# Patient Record
Sex: FEMALE | Race: BLACK OR AFRICAN AMERICAN | Employment: OTHER | URBAN - METROPOLITAN AREA
[De-identification: names, ages, dates, MRNs, and addresses within clinical notes are randomized per-mention and may not be internally consistent; named-entity substitution may affect disease eponyms.]

---

## 2017-06-03 ENCOUNTER — HOSPITAL ENCOUNTER (EMERGENCY)
Age: 82
Discharge: HOME | End: 2017-06-03
Attending: EMERGENCY MEDICINE
Payer: MEDICARE

## 2017-06-03 ENCOUNTER — APPOINTMENT (OUTPATIENT)
Dept: CT IMAGING | Age: 82
End: 2017-06-03
Attending: EMERGENCY MEDICINE
Payer: MEDICARE

## 2017-06-03 VITALS
DIASTOLIC BLOOD PRESSURE: 47 MMHG | SYSTOLIC BLOOD PRESSURE: 155 MMHG | HEART RATE: 88 BPM | TEMPERATURE: 96.9 F | RESPIRATION RATE: 12 BRPM | OXYGEN SATURATION: 96 %

## 2017-06-03 DIAGNOSIS — R10.84 ABDOMINAL PAIN, GENERALIZED: Primary | ICD-10-CM

## 2017-06-03 LAB
ALBUMIN SERPL BCP-MCNC: 3.9 G/DL (ref 3.5–5)
ALBUMIN/GLOB SERPL: 1.2 {RATIO} (ref 1.1–2.2)
ALP SERPL-CCNC: 65 U/L (ref 45–117)
ALT SERPL-CCNC: 41 U/L (ref 12–78)
ANION GAP BLD CALC-SCNC: 7 MMOL/L (ref 5–15)
APPEARANCE UR: CLEAR
AST SERPL W P-5'-P-CCNC: 21 U/L (ref 15–37)
BACTERIA URNS QL MICRO: NEGATIVE /HPF
BASOPHILS # BLD AUTO: 0 K/UL (ref 0–0.1)
BASOPHILS # BLD: 0 % (ref 0–1)
BILIRUB SERPL-MCNC: 0.4 MG/DL (ref 0.2–1)
BILIRUB UR QL: NEGATIVE
BUN SERPL-MCNC: 18 MG/DL (ref 6–20)
BUN/CREAT SERPL: 17 (ref 12–20)
CALCIUM SERPL-MCNC: 9.3 MG/DL (ref 8.5–10.1)
CHLORIDE SERPL-SCNC: 107 MMOL/L (ref 97–108)
CO2 SERPL-SCNC: 27 MMOL/L (ref 21–32)
COLOR UR: ABNORMAL
CREAT SERPL-MCNC: 1.07 MG/DL (ref 0.55–1.02)
EOSINOPHIL # BLD: 0.1 K/UL (ref 0–0.4)
EOSINOPHIL NFR BLD: 1 % (ref 0–7)
EPITH CASTS URNS QL MICRO: ABNORMAL /LPF
ERYTHROCYTE [DISTWIDTH] IN BLOOD BY AUTOMATED COUNT: 14 % (ref 11.5–14.5)
GLOBULIN SER CALC-MCNC: 3.3 G/DL (ref 2–4)
GLUCOSE SERPL-MCNC: 115 MG/DL (ref 65–100)
GLUCOSE UR STRIP.AUTO-MCNC: NEGATIVE MG/DL
HCT VFR BLD AUTO: 40.4 % (ref 35–47)
HGB BLD-MCNC: 13.2 G/DL (ref 11.5–16)
HGB UR QL STRIP: NEGATIVE
HYALINE CASTS URNS QL MICRO: ABNORMAL /LPF (ref 0–5)
KETONES UR QL STRIP.AUTO: 40 MG/DL
LEUKOCYTE ESTERASE UR QL STRIP.AUTO: NEGATIVE
LIPASE SERPL-CCNC: 106 U/L (ref 73–393)
LYMPHOCYTES # BLD AUTO: 14 % (ref 12–49)
LYMPHOCYTES # BLD: 1.8 K/UL (ref 0.8–3.5)
MCH RBC QN AUTO: 28.1 PG (ref 26–34)
MCHC RBC AUTO-ENTMCNC: 32.7 G/DL (ref 30–36.5)
MCV RBC AUTO: 86 FL (ref 80–99)
MONOCYTES # BLD: 1.1 K/UL (ref 0–1)
MONOCYTES NFR BLD AUTO: 9 % (ref 5–13)
NEUTS SEG # BLD: 9.9 K/UL (ref 1.8–8)
NEUTS SEG NFR BLD AUTO: 76 % (ref 32–75)
NITRITE UR QL STRIP.AUTO: NEGATIVE
PH UR STRIP: 5 [PH] (ref 5–8)
PLATELET # BLD AUTO: 174 K/UL (ref 150–400)
POTASSIUM SERPL-SCNC: 4 MMOL/L (ref 3.5–5.1)
PROT SERPL-MCNC: 7.2 G/DL (ref 6.4–8.2)
PROT UR STRIP-MCNC: NEGATIVE MG/DL
RBC # BLD AUTO: 4.7 M/UL (ref 3.8–5.2)
RBC #/AREA URNS HPF: ABNORMAL /HPF (ref 0–5)
SODIUM SERPL-SCNC: 141 MMOL/L (ref 136–145)
SP GR UR REFRACTOMETRY: 1.02 (ref 1–1.03)
UA: UC IF INDICATED,UAUC: ABNORMAL
UROBILINOGEN UR QL STRIP.AUTO: 0.2 EU/DL (ref 0.2–1)
WBC # BLD AUTO: 13 K/UL (ref 3.6–11)
WBC URNS QL MICRO: ABNORMAL /HPF (ref 0–4)

## 2017-06-03 PROCEDURE — 36415 COLL VENOUS BLD VENIPUNCTURE: CPT | Performed by: EMERGENCY MEDICINE

## 2017-06-03 PROCEDURE — 80053 COMPREHEN METABOLIC PANEL: CPT | Performed by: EMERGENCY MEDICINE

## 2017-06-03 PROCEDURE — 74177 CT ABD & PELVIS W/CONTRAST: CPT

## 2017-06-03 PROCEDURE — 74011250636 HC RX REV CODE- 250/636: Performed by: EMERGENCY MEDICINE

## 2017-06-03 PROCEDURE — 74011636320 HC RX REV CODE- 636/320: Performed by: EMERGENCY MEDICINE

## 2017-06-03 PROCEDURE — 96360 HYDRATION IV INFUSION INIT: CPT

## 2017-06-03 PROCEDURE — 96361 HYDRATE IV INFUSION ADD-ON: CPT

## 2017-06-03 PROCEDURE — 83690 ASSAY OF LIPASE: CPT | Performed by: EMERGENCY MEDICINE

## 2017-06-03 PROCEDURE — 96372 THER/PROPH/DIAG INJ SC/IM: CPT

## 2017-06-03 PROCEDURE — 85025 COMPLETE CBC W/AUTO DIFF WBC: CPT | Performed by: EMERGENCY MEDICINE

## 2017-06-03 PROCEDURE — 81001 URINALYSIS AUTO W/SCOPE: CPT | Performed by: EMERGENCY MEDICINE

## 2017-06-03 PROCEDURE — 99285 EMERGENCY DEPT VISIT HI MDM: CPT

## 2017-06-03 RX ORDER — SODIUM CHLORIDE 0.9 % (FLUSH) 0.9 %
10 SYRINGE (ML) INJECTION
Status: COMPLETED | OUTPATIENT
Start: 2017-06-03 | End: 2017-06-03

## 2017-06-03 RX ORDER — DICYCLOMINE HYDROCHLORIDE 10 MG/ML
20 INJECTION INTRAMUSCULAR
Status: COMPLETED | OUTPATIENT
Start: 2017-06-03 | End: 2017-06-03

## 2017-06-03 RX ORDER — DICYCLOMINE HYDROCHLORIDE 20 MG/1
20 TABLET ORAL EVERY 6 HOURS
Qty: 20 TAB | Refills: 0 | Status: SHIPPED | OUTPATIENT
Start: 2017-06-03 | End: 2017-06-08

## 2017-06-03 RX ORDER — SODIUM CHLORIDE 9 MG/ML
50 INJECTION, SOLUTION INTRAVENOUS
Status: COMPLETED | OUTPATIENT
Start: 2017-06-03 | End: 2017-06-03

## 2017-06-03 RX ORDER — ONDANSETRON 4 MG/1
4 TABLET, ORALLY DISINTEGRATING ORAL
Qty: 10 TAB | Refills: 0 | Status: SHIPPED | OUTPATIENT
Start: 2017-06-03 | End: 2017-11-23

## 2017-06-03 RX ADMIN — IOPAMIDOL 100 ML: 755 INJECTION, SOLUTION INTRAVENOUS at 21:13

## 2017-06-03 RX ADMIN — SODIUM CHLORIDE 50 ML/HR: 900 INJECTION, SOLUTION INTRAVENOUS at 21:13

## 2017-06-03 RX ADMIN — DICYCLOMINE HYDROCHLORIDE 20 MG: 20 INJECTION, SOLUTION INTRAMUSCULAR at 20:20

## 2017-06-03 RX ADMIN — Medication 10 ML: at 21:13

## 2017-06-03 RX ADMIN — SODIUM CHLORIDE 1000 ML: 900 INJECTION, SOLUTION INTRAVENOUS at 20:15

## 2017-06-03 NOTE — ED NOTES
Pt arrives to ED via EMS c/o RUQ abd pain with rebound tenderness. Pt states she had one episode of vomiting on her porch this evening. Monitor x 2.

## 2017-06-03 NOTE — ED PROVIDER NOTES
HPI Comments: [id number], 80 y.o. Female with PMHx of HTN, stroke, dementia, anxiety, and anemia presents via EMS to the ED with cc of diffuse abd pain and N/V x this afternoon. Pt has dementia and does not currently have any complaints. Daughter reports that she stopped by her parent's house to feed them and her father reported that the pt had vomited ~ 25 minutes prior to her arrival. The pt vomited again soon after and daughter found her slumped over and SOB. The daughter called EMS and reports more episodes of vomiting upon their arrival. Per daughter, the pt intermittently gets episodes of abd pain when she eats early or late. Pt has not eaten anything since this morning. Pt reports normal BMs and urination. Her last BM was this morning. Pt denies any fevers, chills, diarrhea, CP, or cough. PCP: David Cummings MD    Social history significant for: - Tobacco, - EtOH, - Illicit Drug Use  PSHx: cholecystomy     There are no other complaints, changes, or physical findings at this time. Written by ADITHYA Landis, as dictated by Yvrose Freedman MD.      The history is provided by the patient. No  was used.         Past Medical History:   Diagnosis Date    Anemia     Arthritis     Chronic pain     all over due to arthritis/knees/back/hand    DEMENTIA     Glaucoma     Hypertension     Other ill-defined conditions(799.89)     hiatal hernia    Other ill-defined conditions(799.89)     glaucoma    Other ill-defined conditions(799.89)     diverticulosis    Other ill-defined conditions(799.89)     2 cysts in arms    Other ill-defined conditions(799.89)     intestional blockage    Psychiatric disorder     dementia/anxiety    Stroke [facility])     mini stroke       Past Surgical History:   Procedure Laterality Date    HX GYN      surgery to removed  fetus (abdominal incision)    HX LAP CHOLECYSTECTOMY      HX ORTHOPAEDIC      cyst removed from right arm - benign         No family history on file. Social History     Social History    Marital status:      Spouse name: N/A    Number of children: N/A    Years of education: N/A     Occupational History    Not on file. Social History Main Topics    Smoking status: Former Smoker    Smokeless tobacco: Not on file      Comment: former cigarette smoker - 15 yrs ago    Alcohol use No    Drug use: Not on file    Sexual activity: Not on file     Other Topics Concern    Not on file     Social History Narrative         ALLERGIES: Review of patient's allergies indicates no known allergies. Review of Systems   Constitutional: Negative for chills. HENT: Negative for congestion, rhinorrhea, sneezing and sore throat. Eyes: Negative for redness and visual disturbance. Respiratory: Negative for shortness of breath. Cardiovascular: Negative for leg swelling. Gastrointestinal: Positive for abdominal pain, nausea and vomiting. Genitourinary: Negative for difficulty urinating. Musculoskeletal: Negative for neck stiffness. Skin: Negative for rash. Neurological: Negative for dizziness, syncope and weakness. Hematological: Negative for adenopathy. Patient Vitals for the past 12 hrs:   Temp Pulse Resp BP SpO2   06/03/17 1830 - - - 155/69 96 %   06/03/17 1800 - - - 157/86 96 %   06/03/17 1748 96.9 °F (36.1 °C) 88 12 - 96 %   06/03/17 1746 - - - 160/90 -       Physical Exam   Constitutional: She is oriented to person, place, and time. She appears well-developed and well-nourished. Pleasantly demented   HENT:   Head: Normocephalic and atraumatic. Mouth/Throat: Oropharynx is clear and moist.   Eyes: Conjunctivae and EOM are normal.   Neck: Normal range of motion and full passive range of motion without pain. Neck supple. Cardiovascular: Normal rate, regular rhythm, S1 normal, S2 normal, normal heart sounds, intact distal pulses and normal pulses. No murmur heard.   Pulmonary/Chest: Effort normal and breath sounds normal. No respiratory distress. She has no wheezes. Abdominal: Soft. Normal appearance and bowel sounds are normal. She exhibits no distension. There is tenderness (diffuse). There is guarding. There is no rebound. Musculoskeletal: Normal range of motion. Neurological: She is alert and oriented to person, place, and time. She has normal strength. Skin: Skin is warm, dry and intact. No rash noted. Psychiatric: She has a normal mood and affect. Her speech is normal and behavior is normal. Judgment and thought content normal.   Nursing note and vitals reviewed. MDM  Number of Diagnoses or Management Options  Diagnosis management comments: DDx: UTI, constipation, colitis, gastroenteritis, PNA, pancreatitis        Amount and/or Complexity of Data Reviewed  Clinical lab tests: ordered and reviewed  Obtain history from someone other than the patient: yes (Daughter)  Review and summarize past medical records: yes    Patient Progress  Patient progress: stable    ED Course       Procedures    7:42 PM  Pt asked for pain medication, will order Bentyl. Written by ADITHYA Hsu, as dictated by Janeth Ba MD.     9:46 PM   Pain has completely resolved and pt is ready for d/c  Written by ADITHYA Hsu, as dictated by Janeth Ba MD.    LABORATORY TESTS:  Recent Results (from the past 12 hour(s))   CBC WITH AUTOMATED DIFF    Collection Time: 06/03/17  5:54 PM   Result Value Ref Range    WBC 13.0 (H) 3.6 - 11.0 K/uL    RBC 4.70 3.80 - 5.20 M/uL    HGB 13.2 11.5 - 16.0 g/dL    HCT 40.4 35.0 - 47.0 %    MCV 86.0 80.0 - 99.0 FL    MCH 28.1 26.0 - 34.0 PG    MCHC 32.7 30.0 - 36.5 g/dL    RDW 14.0 11.5 - 14.5 %    PLATELET 182 523 - 874 K/uL    NEUTROPHILS 76 (H) 32 - 75 %    LYMPHOCYTES 14 12 - 49 %    MONOCYTES 9 5 - 13 %    EOSINOPHILS 1 0 - 7 %    BASOPHILS 0 0 - 1 %    ABS. NEUTROPHILS 9.9 (H) 1.8 - 8.0 K/UL    ABS.  LYMPHOCYTES 1.8 0.8 - 3.5 K/UL    ABS. MONOCYTES 1.1 (H) 0.0 - 1.0 K/UL    ABS. EOSINOPHILS 0.1 0.0 - 0.4 K/UL    ABS. BASOPHILS 0.0 0.0 - 0.1 K/UL   METABOLIC PANEL, COMPREHENSIVE    Collection Time: 06/03/17  5:54 PM   Result Value Ref Range    Sodium 141 136 - 145 mmol/L    Potassium 4.0 3.5 - 5.1 mmol/L    Chloride 107 97 - 108 mmol/L    CO2 27 21 - 32 mmol/L    Anion gap 7 5 - 15 mmol/L    Glucose 115 (H) 65 - 100 mg/dL    BUN 18 6 - 20 MG/DL    Creatinine 1.07 (H) 0.55 - 1.02 MG/DL    BUN/Creatinine ratio 17 12 - 20      GFR est AA 59 (L) >60 ml/min/1.73m2    GFR est non-AA 49 (L) >60 ml/min/1.73m2    Calcium 9.3 8.5 - 10.1 MG/DL    Bilirubin, total 0.4 0.2 - 1.0 MG/DL    ALT (SGPT) 41 12 - 78 U/L    AST (SGOT) 21 15 - 37 U/L    Alk. phosphatase 65 45 - 117 U/L    Protein, total 7.2 6.4 - 8.2 g/dL    Albumin 3.9 3.5 - 5.0 g/dL    Globulin 3.3 2.0 - 4.0 g/dL    A-G Ratio 1.2 1.1 - 2.2     LIPASE    Collection Time: 06/03/17  5:54 PM   Result Value Ref Range    Lipase 106 73 - 393 U/L   URINALYSIS W/ REFLEX CULTURE    Collection Time: 06/03/17  7:01 PM   Result Value Ref Range    Color YELLOW/STRAW      Appearance CLEAR CLEAR      Specific gravity 1.023 1.003 - 1.030      pH (UA) 5.0 5.0 - 8.0      Protein NEGATIVE  NEG mg/dL    Glucose NEGATIVE  NEG mg/dL    Ketone 40 (A) NEG mg/dL    Bilirubin NEGATIVE  NEG      Blood NEGATIVE  NEG      Urobilinogen 0.2 0.2 - 1.0 EU/dL    Nitrites NEGATIVE  NEG      Leukocyte Esterase NEGATIVE  NEG      UA:UC IF INDICATED CULTURE NOT INDICATED BY UA RESULT CNI      WBC 0-4 0 - 4 /hpf    RBC 0-5 0 - 5 /hpf    Epithelial cells FEW FEW /lpf    Bacteria NEGATIVE  NEG /hpf    Hyaline cast 2-5 0 - 5 /lpf       IMAGING RESULTS:  CT ABD PELV W CONT   Final Result   EXAM: CT ABDOMEN PELVIS WITH CONTRAST  INDICATION: Dementia with diffuse abdominal pain, vomiting and leukocytosis,  evaluate for infectious cause. COMPARISON: None.   CONTRAST: 100 mL of Isovue-370.     TECHNIQUE:   Multislice helical CT was performed from the diaphragm to the symphysis pubis  during uneventful rapid bolus intravenous contrast administration. Oral contrast  was not administered. Contiguous 5 mm axial images were reconstructed and lung  and soft tissue windows were generated. Coronal and sagittal reformations were  generated. CT dose reduction was achieved through use of a standardized protocol  tailored for this examination and automatic exposure control for dose  modulation.     FINDINGS:  LOWER CHEST: The visualized portions of the lung bases are clear.     ABDOMEN:  Liver: The liver is normal in size and contour with no focal abnormality. Gallbladder and bile ducts: There are clips in the gallbladder fossa and the  gallbladder is absent. There is mild intrahepatic and extra hepatic biliary duct  dilatation with no mass or stone identified. Spleen: No abnormality. Pancreas: No abnormality. Adrenal glands: No abnormality. Kidneys: No abnormality.     PELVIS:  Reproductive organs: The uterus is small. Bladder: No abnormality.      BOWEL AND MESENTERY: There is fluid in the small bowel without bowel  obstruction. There is no mesenteric mass or adenopathy. The appendix is normal.  There are colonic diverticula and there is gas and stool throughout the colon  to the rectum.      PERITONEUM: There is no ascites or free intraperitoneal air.     RETROPERITONEUM: The aorta tapers without aneurysm. There is no retroperitoneal  adenopathy or mass. There is no pelvic mass or adenopathy.     BONES AND SOFT TISSUES: The bones and soft tissues of the abdominal wall are  within normal limits.     IMPRESSION  IMPRESSION:   1. No acute abdominal or pelvic abnormality. 2. Status post cholecystectomy with biliary duct dilatation. Clinical and  laboratory correlation is suggested. 3. Fluid-filled small bowel without obstruction. 4. Colonic diverticulosis without evidence of diverticulitis.   5. Atherosclerotic abdominal aorta without aneurysm.    MEDICATIONS GIVEN:  Medications   sodium chloride 0.9 % bolus infusion 1,000 mL (1,000 mL IntraVENous New Bag 6/3/17 2015)   dicyclomine (BENTYL) 10 mg/mL injection 20 mg (20 mg IntraMUSCular Given 6/3/17 2020)   0.9% sodium chloride infusion (50 mL/hr IntraVENous New Bag 6/3/17 2113)   iopamidol (ISOVUE-370) 76 % injection 100 mL (100 mL IntraVENous Given 6/3/17 2113)   sodium chloride (NS) flush 10 mL (10 mL IntraVENous Given 6/3/17 2113)       IMPRESSION:  1. Abdominal pain, generalized        PLAN:  1. Current Discharge Medication List      START taking these medications    Details   dicyclomine (BENTYL) 20 mg tablet Take 1 Tab by mouth every six (6) hours for 20 doses. Qty: 20 Tab, Refills: 0      ondansetron (ZOFRAN ODT) 4 mg disintegrating tablet Take 1 Tab by mouth every eight (8) hours as needed for Nausea. Qty: 10 Tab, Refills: 0         CONTINUE these medications which have NOT CHANGED    Details   BRIMONIDINE TARTRATE/TIMOLOL (COMBIGAN OP) Apply  to eye. aspirin 81 mg chewable tablet Take 81 mg by mouth daily. Indications: pt takes 1/2 tablet      cyanocobalamin (VITAMIN B-12) 1,000 mcg tablet Take 1,000 mcg by mouth daily. meclizine (ANTIVERT) 25 mg tablet Take 1 Tab by mouth three (3) times daily as needed for Dizziness. Qty: 20 Tab, Refills: 0      cephALEXin (KEFLEX) 500 mg capsule Take 1 Cap by mouth three (3) times daily. Qty: 15 Cap, Refills: 0      diclofenac (VOLTAREN) 1 % topical gel Apply 4 g to affected area three (3) times daily. GLUCOSAMINE HCL/CHONDR JONES A NA (GLUCOSAMINE-CHONDROITIN) 750-600 mg tab Take 1 Tab by mouth daily. acetaminophen (TYLENOL) 325 mg tablet Take 325 mg by mouth every six (6) hours as needed for Pain. MEMANTINE HCL (NAMENDA PO) Take 28 mg by mouth daily. ascorbic acid (VITAMIN C) 250 mg tablet Take 500 mg by mouth daily. POTASSIUM CHLORIDE (KLOR-CON PO) Take 10 mEq by mouth daily.       FERROUS SULFATE Take 1 Tab by mouth daily.      diltiazem CD (CARTIA XT) 300 mg ER capsule Take 300 mg by mouth daily. spironolactone (ALDACTONE) 25 mg tablet Take 50 mg by mouth daily. 2.   Follow-up Information     Follow up With Details Comments Contact Info    Talia Reid MD Call in 2 days  [address] [place] . [address]  [phone]      [facility] EMERGENCY DEPT  As needed, If symptoms worsen [address] [place] [address]  [phone]        Return to ED if worse     Discharge Note:  9:51 PM  The pt is ready for discharge. The pt's signs, symptoms, diagnosis, and discharge instructions have been discussed and pt has conveyed their understanding. The pt is to follow up as recommended or return to ER should their symptoms worsen. Plan has been discussed and pt is in agreement. This note is prepared by Misty Frank, acting as a Scribe for Gray Wheeler MD.    Gray Wheeler MD: The scribe's documentation has been prepared under my direction and personally reviewed by me in its entirety. I confirm that the notes above accurately reflects all work, treatment, procedures, and medical decision making performed by me.

## 2017-06-04 NOTE — ED NOTES
Care assumed and bedside SBAR report endorsed on 79yo female with PMH arthritis, dementia, cholecystectomy presents to ED for nausea and RUQ abdominal pain, alert and oriented x3, pain currently within manageable limits, IV patent, plan of care reinforced, bed in lowest position, side rails up x2, call bell within reach, will continue to monitor. 2035  IVF administered pre CT scan. 9:57 PM   Bed bath and linen change for urinary incontinence, transported to radiology for CT scan. 11:24 PM  Provider at bedside for dispo and follow up. Discharge plan reviewed and paperwork signed, pain level within manageable comfortable limits, IV removed, ambulatory to exit, gait steady, safety maintained.

## 2017-06-04 NOTE — DISCHARGE INSTRUCTIONS

## 2017-11-23 ENCOUNTER — HOSPITAL ENCOUNTER (INPATIENT)
Age: 82
LOS: 4 days | Discharge: NURSING, REHAB OR LONG-TERM CARE | DRG: 293 | End: 2017-11-27
Attending: EMERGENCY MEDICINE | Admitting: INTERNAL MEDICINE
Payer: MEDICARE

## 2017-11-23 ENCOUNTER — APPOINTMENT (OUTPATIENT)
Dept: GENERAL RADIOLOGY | Age: 82
DRG: 293 | End: 2017-11-23
Attending: EMERGENCY MEDICINE
Payer: MEDICARE

## 2017-11-23 DIAGNOSIS — I50.9 ACUTE CONGESTIVE HEART FAILURE, UNSPECIFIED CONGESTIVE HEART FAILURE TYPE: Primary | ICD-10-CM

## 2017-11-23 DIAGNOSIS — R00.1 BRADYCARDIA: ICD-10-CM

## 2017-11-23 DIAGNOSIS — R29.6 FALLS FREQUENTLY: ICD-10-CM

## 2017-11-23 LAB
ALBUMIN SERPL-MCNC: 3.4 G/DL (ref 3.5–5)
ALBUMIN/GLOB SERPL: 0.9 {RATIO} (ref 1.1–2.2)
ALP SERPL-CCNC: 61 U/L (ref 45–117)
ALT SERPL-CCNC: 33 U/L (ref 12–78)
ANION GAP SERPL CALC-SCNC: 8 MMOL/L (ref 5–15)
APPEARANCE UR: CLEAR
AST SERPL-CCNC: 36 U/L (ref 15–37)
BACTERIA URNS QL MICRO: ABNORMAL /HPF
BASOPHILS # BLD: 0 K/UL (ref 0–0.1)
BASOPHILS NFR BLD: 0 % (ref 0–1)
BILIRUB SERPL-MCNC: 0.3 MG/DL (ref 0.2–1)
BILIRUB UR QL: NEGATIVE
BNP SERPL-MCNC: ABNORMAL PG/ML (ref 0–450)
BUN SERPL-MCNC: 17 MG/DL (ref 6–20)
BUN/CREAT SERPL: 17 (ref 12–20)
CALCIUM SERPL-MCNC: 8.7 MG/DL (ref 8.5–10.1)
CHLORIDE SERPL-SCNC: 110 MMOL/L (ref 97–108)
CO2 SERPL-SCNC: 26 MMOL/L (ref 21–32)
COLOR UR: ABNORMAL
CREAT SERPL-MCNC: 0.99 MG/DL (ref 0.55–1.02)
EOSINOPHIL # BLD: 0.3 K/UL (ref 0–0.4)
EOSINOPHIL NFR BLD: 4 % (ref 0–7)
EPITH CASTS URNS QL MICRO: ABNORMAL /LPF
ERYTHROCYTE [DISTWIDTH] IN BLOOD BY AUTOMATED COUNT: 14.5 % (ref 11.5–14.5)
GLOBULIN SER CALC-MCNC: 3.6 G/DL (ref 2–4)
GLUCOSE SERPL-MCNC: 89 MG/DL (ref 65–100)
GLUCOSE UR STRIP.AUTO-MCNC: NEGATIVE MG/DL
HCT VFR BLD AUTO: 34.9 % (ref 35–47)
HGB BLD-MCNC: 11.3 G/DL (ref 11.5–16)
HGB UR QL STRIP: NEGATIVE
KETONES UR QL STRIP.AUTO: NEGATIVE MG/DL
LEUKOCYTE ESTERASE UR QL STRIP.AUTO: NEGATIVE
LYMPHOCYTES # BLD: 2.4 K/UL (ref 0.8–3.5)
LYMPHOCYTES NFR BLD: 33 % (ref 12–49)
MCH RBC QN AUTO: 27.9 PG (ref 26–34)
MCHC RBC AUTO-ENTMCNC: 32.4 G/DL (ref 30–36.5)
MCV RBC AUTO: 86.2 FL (ref 80–99)
MONOCYTES # BLD: 1 K/UL (ref 0–1)
MONOCYTES NFR BLD: 13 % (ref 5–13)
MUCOUS THREADS URNS QL MICRO: ABNORMAL /LPF
NEUTS SEG # BLD: 3.7 K/UL (ref 1.8–8)
NEUTS SEG NFR BLD: 50 % (ref 32–75)
NITRITE UR QL STRIP.AUTO: NEGATIVE
PH UR STRIP: 5.5 [PH] (ref 5–8)
PLATELET # BLD AUTO: 178 K/UL (ref 150–400)
POTASSIUM SERPL-SCNC: 3.9 MMOL/L (ref 3.5–5.1)
PROT SERPL-MCNC: 7 G/DL (ref 6.4–8.2)
PROT UR STRIP-MCNC: NEGATIVE MG/DL
RBC # BLD AUTO: 4.05 M/UL (ref 3.8–5.2)
RBC #/AREA URNS HPF: ABNORMAL /HPF (ref 0–5)
SODIUM SERPL-SCNC: 144 MMOL/L (ref 136–145)
SP GR UR REFRACTOMETRY: 1.02 (ref 1–1.03)
TROPONIN I SERPL-MCNC: 0.04 NG/ML
UA: UC IF INDICATED,UAUC: ABNORMAL
UROBILINOGEN UR QL STRIP.AUTO: 1 EU/DL (ref 0.2–1)
WBC # BLD AUTO: 7.3 K/UL (ref 3.6–11)
WBC URNS QL MICRO: ABNORMAL /HPF (ref 0–4)

## 2017-11-23 PROCEDURE — 74011250636 HC RX REV CODE- 250/636: Performed by: EMERGENCY MEDICINE

## 2017-11-23 PROCEDURE — 99285 EMERGENCY DEPT VISIT HI MDM: CPT

## 2017-11-23 PROCEDURE — 73562 X-RAY EXAM OF KNEE 3: CPT

## 2017-11-23 PROCEDURE — 80053 COMPREHEN METABOLIC PANEL: CPT | Performed by: EMERGENCY MEDICINE

## 2017-11-23 PROCEDURE — 36415 COLL VENOUS BLD VENIPUNCTURE: CPT | Performed by: EMERGENCY MEDICINE

## 2017-11-23 PROCEDURE — 74011250637 HC RX REV CODE- 250/637: Performed by: INTERNAL MEDICINE

## 2017-11-23 PROCEDURE — 71010 XR CHEST PORT: CPT

## 2017-11-23 PROCEDURE — 85025 COMPLETE CBC W/AUTO DIFF WBC: CPT | Performed by: EMERGENCY MEDICINE

## 2017-11-23 PROCEDURE — 81001 URINALYSIS AUTO W/SCOPE: CPT | Performed by: EMERGENCY MEDICINE

## 2017-11-23 PROCEDURE — 65660000000 HC RM CCU STEPDOWN

## 2017-11-23 PROCEDURE — 83880 ASSAY OF NATRIURETIC PEPTIDE: CPT | Performed by: EMERGENCY MEDICINE

## 2017-11-23 PROCEDURE — 93005 ELECTROCARDIOGRAM TRACING: CPT

## 2017-11-23 PROCEDURE — 87086 URINE CULTURE/COLONY COUNT: CPT | Performed by: EMERGENCY MEDICINE

## 2017-11-23 PROCEDURE — 77030038269 HC DRN EXT URIN PURWCK BARD -A

## 2017-11-23 PROCEDURE — 74011250636 HC RX REV CODE- 250/636: Performed by: INTERNAL MEDICINE

## 2017-11-23 PROCEDURE — 96374 THER/PROPH/DIAG INJ IV PUSH: CPT

## 2017-11-23 PROCEDURE — 84484 ASSAY OF TROPONIN QUANT: CPT | Performed by: EMERGENCY MEDICINE

## 2017-11-23 RX ORDER — FUROSEMIDE 10 MG/ML
40 INJECTION INTRAMUSCULAR; INTRAVENOUS EVERY 12 HOURS
Status: DISCONTINUED | OUTPATIENT
Start: 2017-11-24 | End: 2017-11-24

## 2017-11-23 RX ORDER — SODIUM CHLORIDE 0.9 % (FLUSH) 0.9 %
5-10 SYRINGE (ML) INJECTION AS NEEDED
Status: DISCONTINUED | OUTPATIENT
Start: 2017-11-23 | End: 2017-11-27 | Stop reason: HOSPADM

## 2017-11-23 RX ORDER — SODIUM CHLORIDE 0.9 % (FLUSH) 0.9 %
5-10 SYRINGE (ML) INJECTION EVERY 8 HOURS
Status: DISCONTINUED | OUTPATIENT
Start: 2017-11-23 | End: 2017-11-27 | Stop reason: HOSPADM

## 2017-11-23 RX ORDER — ENOXAPARIN SODIUM 100 MG/ML
40 INJECTION SUBCUTANEOUS EVERY 24 HOURS
Status: DISCONTINUED | OUTPATIENT
Start: 2017-11-23 | End: 2017-11-27 | Stop reason: HOSPADM

## 2017-11-23 RX ORDER — FUROSEMIDE 20 MG/1
TABLET ORAL DAILY
Status: ON HOLD | COMMUNITY
End: 2017-11-27

## 2017-11-23 RX ORDER — MEMANTINE HYDROCHLORIDE 10 MG/1
10 TABLET ORAL 2 TIMES DAILY
Status: DISCONTINUED | OUTPATIENT
Start: 2017-11-23 | End: 2017-11-27 | Stop reason: HOSPADM

## 2017-11-23 RX ORDER — ONDANSETRON 2 MG/ML
4 INJECTION INTRAMUSCULAR; INTRAVENOUS
Status: DISCONTINUED | OUTPATIENT
Start: 2017-11-23 | End: 2017-11-27 | Stop reason: HOSPADM

## 2017-11-23 RX ORDER — FUROSEMIDE 10 MG/ML
80 INJECTION INTRAMUSCULAR; INTRAVENOUS
Status: COMPLETED | OUTPATIENT
Start: 2017-11-23 | End: 2017-11-23

## 2017-11-23 RX ORDER — ACETAMINOPHEN 325 MG/1
650 TABLET ORAL
Status: DISCONTINUED | OUTPATIENT
Start: 2017-11-23 | End: 2017-11-27 | Stop reason: HOSPADM

## 2017-11-23 RX ADMIN — FUROSEMIDE 80 MG: 10 INJECTION, SOLUTION INTRAMUSCULAR; INTRAVENOUS at 16:29

## 2017-11-23 RX ADMIN — Medication 10 ML: at 21:28

## 2017-11-23 RX ADMIN — MEMANTINE 10 MG: 10 TABLET ORAL at 19:40

## 2017-11-23 RX ADMIN — ENOXAPARIN SODIUM 40 MG: 40 INJECTION SUBCUTANEOUS at 21:28

## 2017-11-23 NOTE — ED NOTES
TRANSFER - OUT REPORT:    Verbal report given to [place], RN (name) on Speedy Kincaid  being transferred to [facility] (unit) for routine progression of care       Report consisted of patients Situation, Background, Assessment and   Recommendations(SBAR). Information from the following report(s)SBAR, Kardex, ED Summary, Intake/Output, MAR, Recent Results, Med Rec Status  was reviewed with the receiving nurse. Lines:   Peripheral IV 11/23/17 Right Antecubital (Active)   Site Assessment Clean, dry, & intact 11/23/2017  5:38 PM   Phlebitis Assessment 0 11/23/2017  5:38 PM   Infiltration Assessment 0 11/23/2017  5:38 PM   Dressing Status Clean, dry, & intact 11/23/2017  5:38 PM   Dressing Type Tape;Transparent 11/23/2017  5:38 PM   Hub Color/Line Status Pink;Flushed 11/23/2017  5:38 PM   Action Taken Blood drawn 11/23/2017  5:38 PM        Opportunity for questions and clarification was provided.       Patient transported with:   Vaccibody

## 2017-11-23 NOTE — IP AVS SNAPSHOT
[address] [facility] 83. 
[phone] Patient: Pancho Hall MRN: [id number] MFX:[birth year] About your hospitalization You were admitted on:  November 23, 2017 You last received care in the:  [facility] [place] [facility] CARDIOPULMONARY CARE You were discharged on:  November 27, 2017 Why you were hospitalized Your primary diagnosis was:  Not on File Your diagnoses also included:  Acute Chf (Congestive Heart Failure) (Hcc) Things You Need To Do (next 8 weeks) Follow up with Lisa Hardin MD in 2 week(s) Phone:  [phone] Where:  [address] [facility], [address] [facility], [place] [id number] Schedule an appointment with Unique Ambrosio MD as soon as possible for a visit in 1 week(s) Phone:  [phone] Where:  [address], [address] [facility], [address] [place] [address] Follow up with [address] [place] [address] ([address]) Phone:  [phone] Where:  [address] [facility] 83. Discharge Orders None A check jeremiah indicates which time of day the medication should be taken. My Medications STOP taking these medications   
 spironolactone 25 mg tablet Commonly known as:  ALDACTONE  
   
  
  
TAKE these medications as instructed Instructions Each Dose to Equal  
 Morning Noon Evening Bedtime  
 acetaminophen 325 mg tablet Commonly known as:  TYLENOL Your last dose was: Your next dose is: Take 325 mg by mouth every six (6) hours as needed for Pain. 325 mg  
    
   
   
   
  
 aspirin 81 mg chewable tablet Your last dose was: Your next dose is: Take 40.5 mg by mouth every evening. Indications: pt takes 1/2 tablet 40.5 mg  
    
   
   
   
  
 COMBIGAN OP Your last dose was: Your next dose is:    
   
   
 Administer 1 Drop to both eyes two (2) times a day. 1 Drop docusate sodium 100 mg capsule Commonly known as:  Gevena Living Your last dose was: Your next dose is: Take 1 Cap by mouth two (2) times daily as needed for Constipation for up to 90 days. 100 mg FERROUS SULFATE Your last dose was: Your next dose is: Take 1 Tab by mouth daily. 1 Tab * furosemide 40 mg tablet Commonly known as:  LASIX Your last dose was: Your next dose is: Take 1 Tab by mouth daily. 40 mg  
    
   
   
   
  
 * furosemide 40 mg tablet Commonly known as:  LASIX Your last dose was: Your next dose is: Take 1 Tab by mouth daily. 40 mg  
    
   
   
   
  
 glucosamine-chondroitin 750-600 mg Tab Your last dose was: Your next dose is: Take 1 Tab by mouth daily. 1 Tab  
    
   
   
   
  
 latanoprost 0.005 % ophthalmic solution Commonly known as:  Rosario Kofi Your last dose was: Your next dose is:    
   
   
 Administer 1 Drop to both eyes daily. 1 Drop LOTRIMIN AF (CLOTRIMAZOLE) 1 % topical cream  
Generic drug:  clotrimazole Your last dose was: Your next dose is:    
   
   
 Apply  to affected area two (2) times daily as needed for Skin Irritation (White spots). NAMENDA XR 14 mg capsule Generic drug:  memantine ER Your last dose was: Your next dose is: Take 14 mg by mouth daily. 14 mg  
    
   
   
   
  
 potassium chloride SR 10 mEq tablet Commonly known as:  KLOR-CON 10 Start taking on:  11/28/2017 Your last dose was: Your next dose is: Take 1 Tab by mouth daily. 10 mEq VITAMIN B-12 1,000 mcg tablet Generic drug:  cyanocobalamin Your last dose was: Your next dose is: Take 1,000 mcg by mouth daily. 1000 mcg * ascorbic acid (vitamin C) 500 mg tablet Commonly known as:  VITAMIN C Your last dose was: Your next dose is: Take 500 mg by mouth every evening. 500 mg * VITAMIN C 250 mg tablet Generic drug:  ascorbic acid (vitamin C) Your last dose was: Your next dose is: Take 500 mg by mouth daily. 500 mg  
    
   
   
   
  
 VOLTAREN 1 % Gel Generic drug:  diclofenac Your last dose was: Your next dose is:    
   
   
 Apply 4 g to affected area three (3) times daily as needed for Pain. 4 g * Notice: This list has 4 medication(s) that are the same as other medications prescribed for you. Read the directions carefully, and ask your doctor or other care provider to review them with you. Where to Get Your Medications These medications were sent to [facility], [address] [place] [address] AT [address] [facility] 65, [address] [facility] [address] Phone:  [phone]  
  docusate sodium 100 mg capsule  
 furosemide 40 mg tablet  
 potassium chloride SR 10 mEq tablet Information on where to get these meds will be given to you by the nurse or doctor. ! Ask your nurse or doctor about these medications  
  furosemide 40 mg tablet Discharge Instructions Learning About Heart Failure What is heart failure? Heart failure means that your heart muscle does not pump as much blood as your body needs. Failure does not mean that your heart has stopped. It means that your heart is not pumping as well as it should. Your body has an amazing ability to make up for heart failure. It may do such a good job that you don't know you have a disease. But at some point, your heart and body will no longer be able to keep up. Then fluid starts to build up in your lungs and other parts of your body. What can you expect when you have heart failure? Heart failure is a lifelong (chronic) disease. Treatment may be able to slow the disease and help you feel better. But heart failure tends to get worse over time. Despite this, there are many steps you can take to feel better and stay healthy longer. Early on, your symptoms may not be too bad. As heart failure gets worse, symptoms typically get worse, and you may need to limit your activities. Heart failure can also get worse suddenly. If this happens, you need emergency care. Then, after treatment, your symptoms may go back to being stable (which means they stay the same) for a long time. Heart failure can lead to other health problems, such as heart rhythm problems. Over time, your treatment options may change, especially as your symptoms get worse. As heart failure gets worse, palliative care can help improve the quality of your life. You can do advance care planning todecide what kind of care you want at the end of your life. What are the symptoms? Symptoms of heart failure start to happen when your heart can't pump enough blood to the rest of your body. In the early stages of heart failure, you may: · Feel tired easily. · Be short of breath when you exert yourself. · Feel like your heart is pounding or racing (palpitations). · Feel weak or dizzy. As heart failure gets worse, fluid starts to build up in your lungs and other parts of your body. This may cause you to: · Feel short of breath even at rest. 
· Have swelling (edema), especially in your legs, ankles, and feet. · Gain weight. This may happen over just a day or two, or more slowly. · Cough or wheeze, especially when you lie down. How is heart failure treated? · You'll probably take several medicines. · You might attend cardiac rehabilitation (rehab) to get education and support that help you make lifestyle changes and stay as healthy as possible. · You may get a heart device. A pacemaker helps your heart pump blood. An ICD can stop abnormal heart rhythms. How can you care for yourself? There are many steps you can take to feel better and stay healthy longer. These steps are an important part of treatment. They can help you stay active and enjoy life. · Take your medicine the right way. Avoid medicines that can make your symptoms worse. · Check your weight and symptoms every day. Know what to do if your symptoms get worse. · Limit sodium. This helps keep fluid from building up. It may help you feel better. · Be active. Exercise regularly, but don't exercise too hard. · Be heart-healthy. Eat healthy foods, stay at a healthy weight, limit alcohol, and don't smoke. · Stay as healthy as possible. Avoid colds and flu, get help for depression and anxiety, and manage stress. Follow-up care is a key part of your treatment and safety. Be sure to make and go to all appointments, and call your doctor if you are having problems. It's also a good idea to know your test results and keep a list of the medicines you take. Where can you learn more? Go to http://césar-radha.info/. Enter V379 in the search box to learn more about \"Learning About Heart Failure. \" Current as of: September 21, 2016 Content Version: 11.4 © 2218-0613 Healthwise, Incorporated. Care instructions adapted under license by [employer] (which disclaims liability or warranty for this information). If you have questions about a medical condition or this instruction, always ask your healthcare professional. [place] [address] any warranty or liability for your use of this information. [employer] Announcement We are excited to announce that we are making your provider's discharge notes available to you in [employer].   You will see these notes when they are completed and signed by the physician that discharged you from your recent hospital stay. If you have any questions or concerns about any information you see in Ecal, please call the Health Information Department where you were seen or reach out to your Primary Care Provider for more information about your plan of care. Introducing [facility] & HEALTH SERVICES! Herb Mujica introduces Ecal patient portal. Now you can access parts of your medical record, email your doctor's office, and request medication refills online. 1. In your internet browser, go to [email or web address] 2. Click on the First Time User? Click Here link in the Sign In box. You will see the New Member Sign Up page. 3. Enter your Ecal Access Code exactly as it appears below. You will not need to use this code after youve completed the sign-up process. If you do not sign up before the expiration date, you must request a new code. · Ecal Access Code: [email or web address] Expires: 2/25/2018  3:35 PM 
 
4. Enter the last four digits of your Social Security Number (xxxx) and Date of Birth (mm/dd/yyyy) as indicated and click Submit. You will be taken to the next sign-up page. 5. Create a Ecal ID. This will be your Ecal login ID and cannot be changed, so think of one that is secure and easy to remember. 6. Create a Ecal password. You can change your password at any time. 7. Enter your Password Reset Question and Answer. This can be used at a later time if you forget your password. 8. Enter your e-mail address. You will receive e-mail notification when new information is available in [address] 9. Click Sign Up. You can now view and download portions of your medical record. 10. Click the Download Summary menu link to download a portable copy of your medical information. If you have questions, please visit the Frequently Asked Questions section of the Ecal website. Remember, Ecal is NOT to be used for urgent needs. For medical emergencies, dial 911. Now available from your iPhone and Android! Providers Seen During Your Hospitalization Provider Specialty Primary office phone Joaquin Malone MD Emergency Medicine [phone] Antonia Art MD Internal Medicine [phone] Your Primary Care Physician (PCP) Primary Care Physician Office Phone Office Fax Lynette Santana [phone] You are allergic to the following No active allergies Recent Documentation Height Weight BMI OB Status Smoking Status 1.651 m 66.1 kg 24.25 kg/m2 Postmenopausal Former Smoker Emergency Contacts Name Discharge Info Relation Home Work Mobile Vicky Gunn DISCHARGE CAREGIVER [3] Child [2] [phone] MirnaShawalexandr DISCHARGE CAREGIVER [3] Spouse [3] [phone] Patient Belongings The following personal items are in your possession at time of discharge: 
  Dental Appliances: Lowers, Uppers, At home  Visual Aid: Glasses      Home Medications: None   Jewelry: None  Clothing: At bedside Please provide this summary of care documentation to your next provider. Signatures-by signing, you are acknowledging that this After Visit Summary has been reviewed with you and you have received a copy. Patient Signature:  ____________________________________________________________ Date:  ____________________________________________________________  
  
Loly Ray Provider Signature:  ____________________________________________________________ Date:  ____________________________________________________________

## 2017-11-23 NOTE — CONSULTS
Consult    NAME: Benja Shea   :  1931   MRN:  [id number]     Date/Time:  2017 4:35 PM    Patient PCP: Cruz Alba MD  ________________________________________________________________________     Assessment:     1. Falls  2. Leg swelling  3. Sick sinus syndrome  4. Echo  w/ EF 55%, mild LVH, mild TR, mild AI, RVSP 41mmHg  5. Holter  w/ SR and no sig bradycardia (HR 55-91)  6. Hypertension  7. Intermittent vertigo/ataxia  8. Dementia (significant)  9. Mild carotid disease  10. Usual Cardiologist:  Dr. Diony Hannah:   BNP 12k  EKG shows SR w/ PVC  No significant bradycardia noted on tele from ER  Recently seen by PCP 2d ago w/ d/c of CCB and started lasix    1. Diurese as tolerated; 80mg IV lasix noted from ER  2. Avoid AVNodal blocking agents  3. Keep on tele  4. No invasive procedures are desired by the family  5. Cont ASA  6. Cont aldactone (lytes stable); would aim for moderate control of blood pressure    Thank you for this consult and allowing me to take part in this patients care. Please call with questions. [x]        High complexity decision making was performed    @ OV        Ms Brain Rank has a h/o HTN, intermittent vertigo/ataxia (neg MRI  except extensive WMD) and significant dementia; Her daughter Sylvester Collet, pt of mine) cares for her and her  at night. The pt cannot participate in medical decision making; her daughter would like a non-invasive approach to care. 2016: Intermittent vertigo noted; No angina/CARRERO. Her HR was reportedly palpated at 38 on  PCP visit: ecg from then shows HR in 70s with PVC. No current complaints: mild carotid disease; echo unremarkable. 2016: No Sx; Dilt and aldactone decreased by PCP; dose unknown (?CKD). Did not supply current meds list, will call with updated meds.     CARDIAC HISTORY  ARRHYTHMIA:  1 Bradycardia [Neg holter.] - 2016     RISK FACTORS:  1 Hypertension CARDIOVASCULAR PROCEDURES  ECHO/MUGA:  Echo (EF 0.55 (55%), Mild LVH, Mild TR, Mild AR, Mild MR, LA Diam 4.4 cm, Est RVSP 41 mmHg, Normal RV.) - 5/23/2016   ELECTROPHYSIOLOGY:  EKG (Occasional PVCs, Sinus Rhythm, LAFB, poor r wave progression. No change.) - 8/11/2016   Holter (Sinus Rhythm, No Malignant Arrhythmias, (55-91, ave 71); no significant bradycardia); Incidental ASx 6 beats PAT. ) - 5/12/2016   VASCULAR:  Carotid Duplex (1 - 49% Bilateral ICAs, Vertebral: Bilateral Antegrade Flow) - 5/25/2016       IMPRESSION AND PLAN  01. Bradycardia:  Neg holter 5/2016: no Rx needed at this point. ECG done today   02. Occlusion and stenosis of bilateral carotid arteries:  Mild carotid disease; repeat next year. Carotid Duplex to be done as specified in the orders. 03. Hypertensive heart disease without heart failure:  Adequately controlled. 04. 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   05. Long term (current) use of aspirin:  This condition is stable. 06. Personal history of nicotine dependence:  remains abstinent from tobacco use. 07. Body mass index (BMI) 28.0-28.9, adult         ORDERS:  1 ECG done today   2 Carotid Duplex in 1 Year. 3 Giving encouragement to exercise   4 Dietary management education, guidance, and counseling   5 Return office visit with Yesenia Dailey MD in 1 Year.      FINAL MEDICATION LIST    Medication Sig Description   aspirin 325 mg tablet take 1 tablet by oral route  every day   Cartia  mg capsule,extended release changed by PCP   Glucosamine 500 mg tablet as directed   iron  mg (65 mg iron) capsule,extended release take 1 by Oral route  every day   Klor-Con 8 mEq tablet,extended release take 1 tablet by oral route  every day with food   Namenda XR 14 mg capsule sprinkle,extended release take 1 capsule by oral route  every day   spironolactone 50 mg tablet changed by PCP   Tylenol 325 mg tablet take 2 tablet by oral route  every 4 hours as needed   vitamin B complex tablet 1 po qd   Vitamin C 500 mg tablet one po qd           Subjective:   CHIEF COMPLAINT: Fall, leg swelling    HISTORY OF PRESENT ILLNESS:     Lalo Fitzgerald is a 80 y.o.  female who \"presents via EMS to the ED for evaluation after an unwitnessed mechanical GLF that had occurred earlier this afternoon PTA. Per EMS, they were called by pt's family when they had found her on the ground. They note that her BP was normal, but that she had a low HR in the 40s/50s. Per the pt's daughter, this is the pt's fourth fall in the last 1.5 weeks. She also notes that the pt has had some swelling in her legs for 2 weeks that is getting better. Her PCP had seen her 2 days ago and increased her diuretics with pending blood work drawn yesterday. Daughter also states that pt was found on floor and not opening her eyes as if she was steadily going to sleep and was c/o right hip and knee pain. Pt states that she remembers falling but denies any LOC or head injury and does not report any pain but does feel \"tight\" in her right knee. She also does report any hx of kidney or thyroid problems. \"      We were asked to consult for work up and evaluation of the above problems.      Past Medical History:   Diagnosis Date    Anemia     Arthritis     Chronic pain     all over due to arthritis/knees/back/hand    DEMENTIA     Glaucoma     Hypertension     Other ill-defined conditions(799.89)     hiatal hernia    Other ill-defined conditions(799.89)     glaucoma    Other ill-defined conditions(799.89)     diverticulosis    Other ill-defined conditions(799.89)     2 cysts in arms    Other ill-defined conditions(799.89)     intestional blockage    Psychiatric disorder     dementia/anxiety    Stroke [facility])     mini stroke      Past Surgical History:   Procedure Laterality Date    HX GYN      surgery to removed  fetus (abdominal incision)    HX LAP CHOLECYSTECTOMY      HX ORTHOPAEDIC      cyst removed from right arm - benign     No Known Allergies   Meds:  See below  Social History   Substance Use Topics    Smoking status: Former Smoker    Smokeless tobacco: Never Used      Comment: former cigarette smoker - 15 yrs ago    Alcohol use No      History reviewed. No pertinent family history. REVIEW OF SYSTEMS:     []         Unable to obtain  ROS due to ---   [x]         Total of 12 systems reviewed as follows:    Constitutional: negative fever, negative chills, negative weight loss  Eyes:   negative visual changes  ENT:   negative sore throat, tongue or lip swelling  Respiratory:  negative cough, negative dyspnea  Cards:  negative for chest pain, palpitations, lower extremity edema  GI:   negative for nausea, vomiting, diarrhea, and abdominal pain  Genitourinary: negative for frequency, dysuria  Integument:  negative for rash   Hematologic:  negative for easy bruising and gum/nose bleeding  Musculoskel: negative for myalgias,  back pain  Neurological:  negative for headaches, dizziness, vertigo, weakness  Behavl/Psych: negative for feelings of anxiety, depression     Pertinent Positives include :    Objective:      Physical Exam:    Last 24hrs VS reviewed since prior progress note. Most recent are:    Visit Vitals    /90    Pulse 70    Temp 98.7 °F (37.1 °C)    Resp 16    Ht 5' 5\" (1.651 m)    Wt 77.1 kg (170 lb)    SpO2 96%    BMI 28.29 kg/m2     No intake or output data in the 24 hours ending 11/23/17 1635     General Appearance: Well developed, well nourished, pleasantly demented,    no acute distress. Ears/Nose/Mouth/Throat: Pupils equal and round, Hearing grossly normal.  Neck: Supple. JVP within normal limits. Carotids good upstrokes, with no bruit. Chest: Lungs clear to auscultation bilaterally. Cardiovascular: Regular rate and rhythm, S1S2 normal, soft LENNY  Abdomen: Soft, non-tender, bowel sounds are active. No organomegaly.   Extremities: 2+ edema bilaterally. Femoral pulses +2, Distal Pulses +1. Skin: Warm and dry. Neuro: CN II-XII grossly intact, Strength and sensation grossly intact. []         Post-cath site without hematoma, bruit, tenderness, or thrill. Distal pulses intact. Data:      Telemetry:  SR w/ PVC    EKG:  SR w/ PVC  []  No new EKG for review. Prior to Admission medications    Medication Sig Start Date End Date Taking? Authorizing Provider   furosemide (LASIX) 20 mg tablet Take  by mouth daily. Yes Ashley Florentino MD   BRIMONIDINE TARTRATE/TIMOLOL (COMBIGAN OP) Apply  to eye. Yes Ashley Florentino MD   cyanocobalamin (VITAMIN B-12) 1,000 mcg tablet Take 1,000 mcg by mouth daily. Yes Ashley Florentino MD   diclofenac (VOLTAREN) 1 % topical gel Apply 4 g to affected area three (3) times daily. Yes Ashley Florentino MD   GLUCOSAMINE HCL/CHONDR JONES A NA (GLUCOSAMINE-CHONDROITIN) 750-600 mg tab Take 1 Tab by mouth daily. Yes Ashley Florentino MD   acetaminophen (TYLENOL) 325 mg tablet Take 325 mg by mouth every six (6) hours as needed for Pain. Yes Ashley Florentino MD   MEMANTINE HCL (NAMENDA PO) Take 28 mg by mouth daily. Yes Ashley Florentino MD   ascorbic acid (VITAMIN C) 250 mg tablet Take 500 mg by mouth daily. Yes Ashley Florentino MD   POTASSIUM CHLORIDE (KLOR-CON PO) Take 10 mEq by mouth daily. Yes Ashley Florentino MD   spironolactone (ALDACTONE) 25 mg tablet Take 50 mg by mouth daily. Yes Historical Provider   aspirin 81 mg chewable tablet Take 81 mg by mouth daily. Indications: pt takes 1/2 tablet    Ashley Florentino MD   FERROUS SULFATE Take 1 Tab by mouth daily.     Ashley Florentino MD       Recent Results (from the past 24 hour(s))   URINALYSIS W/ REFLEX CULTURE    Collection Time: 11/23/17  2:22 PM   Result Value Ref Range    Color YELLOW/STRAW      Appearance CLEAR CLEAR      Specific gravity 1.023 1.003 - 1.030      pH (UA) 5.5 5.0 - 8.0      Protein NEGATIVE  NEG mg/dL    Glucose NEGATIVE  NEG mg/dL    Ketone NEGATIVE  NEG mg/dL    Bilirubin NEGATIVE  NEG Blood NEGATIVE  NEG      Urobilinogen 1.0 0.2 - 1.0 EU/dL    Nitrites NEGATIVE  NEG      Leukocyte Esterase NEGATIVE  NEG      WBC 0-4 0 - 4 /hpf    RBC 0-5 0 - 5 /hpf    Epithelial cells MODERATE (A) FEW /lpf    Bacteria 1+ (A) NEG /hpf    UA:UC IF INDICATED URINE CULTURE ORDERED (A) CNI      Mucus 1+ (A) NEG /lpf   CBC WITH AUTOMATED DIFF    Collection Time: 11/23/17  2:25 PM   Result Value Ref Range    WBC 7.3 3.6 - 11.0 K/uL    RBC 4.05 3.80 - 5.20 M/uL    HGB 11.3 (L) 11.5 - 16.0 g/dL    HCT 34.9 (L) 35.0 - 47.0 %    MCV 86.2 80.0 - 99.0 FL    MCH 27.9 26.0 - 34.0 PG    MCHC 32.4 30.0 - 36.5 g/dL    RDW 14.5 11.5 - 14.5 %    PLATELET 618 696 - 218 K/uL    NEUTROPHILS 50 32 - 75 %    LYMPHOCYTES 33 12 - 49 %    MONOCYTES 13 5 - 13 %    EOSINOPHILS 4 0 - 7 %    BASOPHILS 0 0 - 1 %    ABS. NEUTROPHILS 3.7 1.8 - 8.0 K/UL    ABS. LYMPHOCYTES 2.4 0.8 - 3.5 K/UL    ABS. MONOCYTES 1.0 0.0 - 1.0 K/UL    ABS. EOSINOPHILS 0.3 0.0 - 0.4 K/UL    ABS. BASOPHILS 0.0 0.0 - 0.1 K/UL   METABOLIC PANEL, COMPREHENSIVE    Collection Time: 11/23/17  2:25 PM   Result Value Ref Range    Sodium 144 136 - 145 mmol/L    Potassium 3.9 3.5 - 5.1 mmol/L    Chloride 110 (H) 97 - 108 mmol/L    CO2 26 21 - 32 mmol/L    Anion gap 8 5 - 15 mmol/L    Glucose 89 65 - 100 mg/dL    BUN 17 6 - 20 MG/DL    Creatinine 0.99 0.55 - 1.02 MG/DL    BUN/Creatinine ratio 17 12 - 20      GFR est AA >60 >60 ml/min/1.73m2    GFR est non-AA 53 (L) >60 ml/min/1.73m2    Calcium 8.7 8.5 - 10.1 MG/DL    Bilirubin, total 0.3 0.2 - 1.0 MG/DL    ALT (SGPT) 33 12 - 78 U/L    AST (SGOT) 36 15 - 37 U/L    Alk.  phosphatase 61 45 - 117 U/L    Protein, total 7.0 6.4 - 8.2 g/dL    Albumin 3.4 (L) 3.5 - 5.0 g/dL    Globulin 3.6 2.0 - 4.0 g/dL    A-G Ratio 0.9 (L) 1.1 - 2.2     NT-PRO BNP    Collection Time: 11/23/17  2:25 PM   Result Value Ref Range    NT pro-BNP 17685 (H) 0 - 450 PG/ML   TROPONIN I    Collection Time: 11/23/17  2:25 PM   Result Value Ref Range Troponin-I, Qt. 0.04 <0.05 ng/mL        Clari Quinonez III, DO

## 2017-11-23 NOTE — ED NOTES
Assumed care of patient from Jm Ayala RN. Patient resting comfortably on stretcher with call bell within reach. Family at bedside. Rates pain 0/10. Patient HR has been low for previous nurse, when patient goes to sleep her heart rate decreases to high 40s.     1620 Patient sheets changed. Pure wick applied to patient. Daughter and granddaughter remain at bedside.
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
## 2017-11-23 NOTE — IP AVS SNAPSHOT
[address] [facility] 
[phone] Patient: Herminia Wallace MRN: [id number] PUX:[id number] My Medications STOP taking these medications   
 spironolactone 25 mg tablet Commonly known as:  ALDACTONE  
   
  
  
TAKE these medications as instructed Instructions Each Dose to Equal  
 Morning Noon Evening Bedtime  
 acetaminophen 325 mg tablet Commonly known as:  TYLENOL Your last dose was: Your next dose is: Take 325 mg by mouth every six (6) hours as needed for Pain. 325 mg  
    
   
   
   
  
 aspirin 81 mg chewable tablet Your last dose was: Your next dose is: Take 40.5 mg by mouth every evening. Indications: pt takes 1/2 tablet 40.5 mg  
    
   
   
   
  
 COMBIGAN OP Your last dose was: Your next dose is:    
   
   
 Administer 1 Drop to both eyes two (2) times a day. 1 Drop  
    
   
   
   
  
 docusate sodium 100 mg capsule Commonly known as:  Molly Donate Your last dose was: Your next dose is: Take 1 Cap by mouth two (2) times daily as needed for Constipation for up to 90 days. 100 mg FERROUS SULFATE Your last dose was: Your next dose is: Take 1 Tab by mouth daily. 1 Tab * furosemide 40 mg tablet Commonly known as:  LASIX Your last dose was: Your next dose is: Take 1 Tab by mouth daily. 40 mg  
    
   
   
   
  
 * furosemide 40 mg tablet Commonly known as:  LASIX Your last dose was: Your next dose is: Take 1 Tab by mouth daily. 40 mg  
    
   
   
   
  
 glucosamine-chondroitin 750-600 mg Tab Your last dose was: Your next dose is: Take 1 Tab by mouth daily. 1 Tab  
    
   
   
   
  
 latanoprost 0.005 % ophthalmic solution Commonly known as:  Carline Owen Your last dose was: Your next dose is:    
   
   
 Administer 1 Drop to both eyes daily. 1 Drop LOTRIMIN AF (CLOTRIMAZOLE) 1 % topical cream  
Generic drug:  clotrimazole Your last dose was: Your next dose is:    
   
   
 Apply  to affected area two (2) times daily as needed for Skin Irritation (White spots). NAMENDA XR 14 mg capsule Generic drug:  memantine ER Your last dose was: Your next dose is: Take 14 mg by mouth daily. 14 mg  
    
   
   
   
  
 potassium chloride SR 10 mEq tablet Commonly known as:  KLOR-CON 10 Start taking on:  11/28/2017 Your last dose was: Your next dose is: Take 1 Tab by mouth daily. 10 mEq VITAMIN B-12 1,000 mcg tablet Generic drug:  cyanocobalamin Your last dose was: Your next dose is: Take 1,000 mcg by mouth daily. 1000 mcg * ascorbic acid (vitamin C) 500 mg tablet Commonly known as:  VITAMIN C Your last dose was: Your next dose is: Take 500 mg by mouth every evening. 500 mg * VITAMIN C 250 mg tablet Generic drug:  ascorbic acid (vitamin C) Your last dose was: Your next dose is: Take 500 mg by mouth daily. 500 mg  
    
   
   
   
  
 VOLTAREN 1 % Gel Generic drug:  diclofenac Your last dose was: Your next dose is:    
   
   
 Apply 4 g to affected area three (3) times daily as needed for Pain. 4 g * Notice: This list has 4 medication(s) that are the same as other medications prescribed for you. Read the directions carefully, and ask your doctor or other care provider to review them with you. Where to Get Your Medications These medications were sent to [facility], [address] [facility] [address], [address] [facility] [address] Phone:  [phone]  
  docusate sodium 100 mg capsule  
 furosemide 40 mg tablet  
 potassium chloride SR 10 mEq tablet Information on where to get these meds will be given to you by the nurse or doctor. ! Ask your nurse or doctor about these medications  
  furosemide 40 mg tablet

## 2017-11-23 NOTE — H&P
Hospitalist Admission Note    NAME: Jones Eid   :  1931   MRN:  [id number]     Date/Time:  2017 4:54 PM    Patient PCP: Vanda Solorzano MD  ________________________________________________________________________    My assessment of this patient's clinical condition and my plan of care is as follows. Assessment / Plan:    Acute CHF (unknown EF)  Bradycardia  -leg swelling; elevated proBNP 12K; CXR central congestion  -telemetry monitoring  -Echo in AM  -start IV lasix. Follow lytes and daily weights   -consult Cardiology    Frequent falls  Osteoarthritis  -xray R knee Tricompartmental osteoarthritis. Moderate joint effusion  -PT OT eval and treat. May benefit from SNF rehab    Dementia  -mood stable. Continue namenda    TIA  -continue ASA    Glaucoma        Code Status:  Full  Surrogate Decision Maker:  Daughters     DVT Prophylaxis:  lovenox  GI Prophylaxis: not indicated    Baseline:   . Does not use her walker regularly        Subjective:   CHIEF COMPLAINT:  Frequent falls    HISTORY OF PRESENT ILLNESS:     Charity Tolbert is a 80 y.o.  female with a history of HTN, dementia, TIA and OA who presents via EMS to the ER because of frequent falls. Daughter reports that the patient has fallen 4 times in the last 1.5 weeks. Patient is not clear on why falls. Sounds like her legs just gave out as she denies dizziness, vertigo or LOC. She endorses leg swelling x 2 weeks but denies SOB, CP or orthopnea. Her PCP just recently increased her lasix dose. EMS found patient bradycardic in the 40-50s. ER evaluation demonstrates elevated proBNP 12K and cxr with central congestion. We were asked to admit for work up and evaluation of the above problems.      Past Medical History:   Diagnosis Date    Anemia     Arthritis     Chronic pain     all over due to arthritis/knees/back/hand    DEMENTIA     Glaucoma     Hypertension     Other ill-defined conditions(799.89)     hiatal hernia    Other ill-defined conditions(799.89)     glaucoma    Other ill-defined conditions(799.89)     diverticulosis    Other ill-defined conditions(799.89)     2 cysts in arms    Other ill-defined conditions(799.89)     intestional blockage    Psychiatric disorder     dementia/anxiety    Stroke [facility])     mini stroke        Past Surgical History:   Procedure Laterality Date    HX GYN      surgery to removed  fetus (abdominal incision)    HX LAP CHOLECYSTECTOMY      HX ORTHOPAEDIC      cyst removed from right arm - benign       Social History   Substance Use Topics    Smoking status: Former Smoker    Smokeless tobacco: Never Used      Comment: former cigarette smoker - 15 yrs ago    Alcohol use No        FHx no early cad  No Known Allergies     Prior to Admission medications    Medication Sig Start Date End Date Taking? Authorizing Provider   furosemide (LASIX) 20 mg tablet Take  by mouth daily. Yes Ashley Florentino MD   BRIMONIDINE TARTRATE/TIMOLOL (COMBIGAN OP) Apply  to eye. Yes Ashley Florentino MD   cyanocobalamin (VITAMIN B-12) 1,000 mcg tablet Take 1,000 mcg by mouth daily. Yes Ashley Florentino MD   diclofenac (VOLTAREN) 1 % topical gel Apply 4 g to affected area three (3) times daily. Yes Ashley Florentino MD   GLUCOSAMINE HCL/CHONDR JONES A NA (GLUCOSAMINE-CHONDROITIN) 750-600 mg tab Take 1 Tab by mouth daily. Yes Ashley Florentino MD   acetaminophen (TYLENOL) 325 mg tablet Take 325 mg by mouth every six (6) hours as needed for Pain. Yes Ashley Florentino MD   MEMANTINE HCL (NAMENDA PO) Take 28 mg by mouth daily. Yes Ashley Florentino MD   ascorbic acid (VITAMIN C) 250 mg tablet Take 500 mg by mouth daily. Yes Ashley Florentino MD   POTASSIUM CHLORIDE (KLOR-CON PO) Take 10 mEq by mouth daily. Yes Ashley Florentino MD   spironolactone (ALDACTONE) 25 mg tablet Take 50 mg by mouth daily. Yes Historical Provider   aspirin 81 mg chewable tablet Take 81 mg by mouth daily.  Indications: pt takes 1/2 tablet    Ashley Florentino MD   FERROUS SULFATE Take 1 Tab by mouth daily. Ashley Florentino MD       REVIEW OF SYSTEMS:       Total of 12 systems reviewed as follows:       POSITIVE= underlined text  Negative = text not underlined  General:  fever, chills, sweats, generalized weakness, weight loss/gain,      loss of appetite   Eyes:    blurred vision, eye pain, loss of vision, double vision  ENT:    rhinorrhea, pharyngitis   Respiratory:   cough, sputum production, SOB, CARRERO, wheezing, pleuritic pain   Cardiology:   chest pain, palpitations, orthopnea, PND, edema, syncope   Gastrointestinal:  abdominal pain , N/V, diarrhea, dysphagia, constipation, bleeding   Genitourinary:  frequency, urgency, dysuria, hematuria, incontinence   Muskuloskeletal :  arthralgia, myalgia, knee pain  Hematology:  easy bruising, nose or gum bleeding, lymphadenopathy   Dermatological: rash, ulceration, pruritis, color change / jaundice  Endocrine:   hot flashes or polydipsia   Neurological:  headache, dizziness, confusion, focal weakness, paresthesia,     Speech difficulties, memory loss, gait difficulty, frequent falls  Psychological: Feelings of anxiety, depression, agitation    Objective:   VITALS:    Visit Vitals    /90    Pulse 70    Temp 98.7 °F (37.1 °C)    Resp 16    Ht 5' 5\" (1.651 m)    Wt 77.1 kg (170 lb)    SpO2 96%    BMI 28.29 kg/m2       PHYSICAL EXAM:    General:    Alert, cooperative, no distress, appears stated age. HEENT: Atraumatic, anicteric sclerae, pink conjunctivae     No oral ulcers, mucosa moist, throat clear, dentition fair  Neck:  Supple, symmetrical,  thyroid: non tender  Lungs:   Basal rales  Chest wall:  No tenderness  No Accessory muscle use. Heart:   Regular  rhythm,  + leg edema  Abdomen:   Soft, non-tender. Not distended. Bowel sounds normal  Extremities: No cyanosis. No clubbing, Skin turgor normal, Capillary refill normal, Radial dial pulse 2+  Skin:     Not pale.   Not Jaundiced  No rashes   Psych:  Fair to poor insight. Not depressed. Not anxious or agitated. Neurologic: EOMs intact. No facial asymmetry. No aphasia or slurred speech. Weak but equal strength, Sensation grossly intact. Alert and oriented X hospital. Recognize daughter.     _______________________________________________________________________  Care Plan discussed with:    Comments   Patient x    Family  x  daughter   RN     Care Manager                    Consultant:      _______________________________________________________________________  Expected  Disposition:   Home with Family    HH/PT/OT/RN    SNF/LTC x   [place]    ________________________________________________________________________  TOTAL TIME:  39   Minutes    Critical Care Provided     Minutes non procedure based      Comments    x Reviewed previous records   >50% of visit spent in counseling and coordination of care  Discussion with patient and/or family and questions answered       Given the patient's current clinical presentation, I have a high level of concern for decompensation if discharged from the ED. Complex decision making was performed which includes reviewing the patient's available past medical records, laboratory results, and Xray films. I have also directly communicated my plan and discussed this case with the involved ED physician.     ____________________________________________________________________  Daniela Garcia MD    Procedures: see electronic medical records for all procedures/Xrays and details which were not copied into this note but were reviewed prior to creation of Plan.     LAB DATA REVIEWED:    Recent Results (from the past 24 hour(s))   URINALYSIS W/ REFLEX CULTURE    Collection Time: 11/23/17  2:22 PM   Result Value Ref Range    Color YELLOW/STRAW      Appearance CLEAR CLEAR      Specific gravity 1.023 1.003 - 1.030      pH (UA) 5.5 5.0 - 8.0      Protein NEGATIVE  NEG mg/dL    Glucose NEGATIVE  NEG mg/dL    Ketone NEGATIVE  NEG mg/dL    Bilirubin NEGATIVE  NEG      Blood NEGATIVE  NEG      Urobilinogen 1.0 0.2 - 1.0 EU/dL    Nitrites NEGATIVE  NEG      Leukocyte Esterase NEGATIVE  NEG      WBC 0-4 0 - 4 /hpf    RBC 0-5 0 - 5 /hpf    Epithelial cells MODERATE (A) FEW /lpf    Bacteria 1+ (A) NEG /hpf    UA:UC IF INDICATED URINE CULTURE ORDERED (A) CNI      Mucus 1+ (A) NEG /lpf   CBC WITH AUTOMATED DIFF    Collection Time: 11/23/17  2:25 PM   Result Value Ref Range    WBC 7.3 3.6 - 11.0 K/uL    RBC 4.05 3.80 - 5.20 M/uL    HGB 11.3 (L) 11.5 - 16.0 g/dL    HCT 34.9 (L) 35.0 - 47.0 %    MCV 86.2 80.0 - 99.0 FL    MCH 27.9 26.0 - 34.0 PG    MCHC 32.4 30.0 - 36.5 g/dL    RDW 14.5 11.5 - 14.5 %    PLATELET 234 162 - 750 K/uL    NEUTROPHILS 50 32 - 75 %    LYMPHOCYTES 33 12 - 49 %    MONOCYTES 13 5 - 13 %    EOSINOPHILS 4 0 - 7 %    BASOPHILS 0 0 - 1 %    ABS. NEUTROPHILS 3.7 1.8 - 8.0 K/UL    ABS. LYMPHOCYTES 2.4 0.8 - 3.5 K/UL    ABS. MONOCYTES 1.0 0.0 - 1.0 K/UL    ABS. EOSINOPHILS 0.3 0.0 - 0.4 K/UL    ABS. BASOPHILS 0.0 0.0 - 0.1 K/UL   METABOLIC PANEL, COMPREHENSIVE    Collection Time: 11/23/17  2:25 PM   Result Value Ref Range    Sodium 144 136 - 145 mmol/L    Potassium 3.9 3.5 - 5.1 mmol/L    Chloride 110 (H) 97 - 108 mmol/L    CO2 26 21 - 32 mmol/L    Anion gap 8 5 - 15 mmol/L    Glucose 89 65 - 100 mg/dL    BUN 17 6 - 20 MG/DL    Creatinine 0.99 0.55 - 1.02 MG/DL    BUN/Creatinine ratio 17 12 - 20      GFR est AA >60 >60 ml/min/1.73m2    GFR est non-AA 53 (L) >60 ml/min/1.73m2    Calcium 8.7 8.5 - 10.1 MG/DL    Bilirubin, total 0.3 0.2 - 1.0 MG/DL    ALT (SGPT) 33 12 - 78 U/L    AST (SGOT) 36 15 - 37 U/L    Alk.  phosphatase 61 45 - 117 U/L    Protein, total 7.0 6.4 - 8.2 g/dL    Albumin 3.4 (L) 3.5 - 5.0 g/dL    Globulin 3.6 2.0 - 4.0 g/dL    A-G Ratio 0.9 (L) 1.1 - 2.2     NT-PRO BNP    Collection Time: 11/23/17  2:25 PM   Result Value Ref Range    NT pro-BNP 66271 (H) 0 - 450 PG/ML   TROPONIN I    Collection Time: 11/23/17  2:25 PM   Result Value Ref Range    Troponin-I, Qt. 0.04 <0.05 ng/mL

## 2017-11-23 NOTE — ED PROVIDER NOTES
[facility].  EMERGENCY DEPARTMENT HISTORY AND PHYSICAL EXAM       Date of Service: 11/23/2017   Patient Name: Sheila Parham   YOB: 1931  Medical Record Number: [id number]    History of Presenting Illness     Chief Complaint   Patient presents with   [facility] Fall     pt has had numerous falls over the past month, with a fall almost everyday this week. Pt denies any injury. History Provided By:  Patient, EMS, and daughter    Additional History: Sheila Parham is a 80 y.o. female with PMhx significant for HTN, TIA, arthritis, dementia, anemia, and hiatal hernia who presents via EMS to the ED for evaluation after an unwitnessed mechanical GLF that had occurred earlier this afternoon PTA. Per EMS, they were called by pt's family when they had found her on the ground. They note that her BP was normal, but that she had a low HR in the 40s/50s. Per the pt's daughter, this is the pt's fourth fall in the last 1.5 weeks. She also notes that the pt has had some swelling in her legs for 2 weeks that is getting better. Her PCP had seen her 2 days ago and increased her diuretics with pending blood work drawn yesterday. Daughter also states that pt was found on floor and not opening her eyes as if she was steadily going to sleep and was c/o right hip and knee pain. Pt states that she remembers falling but denies any LOC or head injury and does not report any pain but does feel \"tight\" in her right knee. She also does report any hx of kidney or thyroid problems. Social Hx: - Tobacco, - EtOH, - Illicit Drugs    HPI is limited due to pt's dementia.     Primary Care Provider: Isacc Kunz MD   Specialist:   Cardiologist: Gunjan Chery MD    Past History     Past Medical History:   Past Medical History:   Diagnosis Date    Anemia     Arthritis     Chronic pain     all over due to arthritis/knees/back/hand    DEMENTIA     Glaucoma     Hypertension     Other ill-defined conditions(799.89)     hiatal hernia    Other ill-defined conditions(799.89)     glaucoma    Other ill-defined conditions(799.89)     diverticulosis    Other ill-defined conditions(799.89)     2 cysts in arms    Other ill-defined conditions(799.89)     intestional blockage    Psychiatric disorder     dementia/anxiety    Stroke [facility])     mini stroke        Past Surgical History:   Past Surgical History:   Procedure Laterality Date    HX GYN      surgery to removed  fetus (abdominal incision)    HX LAP CHOLECYSTECTOMY      HX ORTHOPAEDIC      cyst removed from right arm - benign        Family History:   History reviewed. No pertinent family history. Social History:   Social History   Substance Use Topics    Smoking status: Former Smoker    Smokeless tobacco: Never Used      Comment: former cigarette smoker - 15 yrs ago    Alcohol use No        Allergies:   No Known Allergies      Review of Systems   Review of Systems   Unable to perform ROS: Dementia       Physical Exam  Physical Exam   Constitutional: She is oriented to person, place, and time. She appears well-developed. HENT:   Head: Normocephalic and atraumatic. Eyes: EOM are normal. Pupils are equal, round, and reactive to light. Neck: Normal range of motion. Cardiovascular: Normal rate and regular rhythm. Pulmonary/Chest: Effort normal and breath sounds normal. She exhibits no tenderness. Abdominal: Soft. She exhibits no distension. There is no tenderness. Musculoskeletal: Normal range of motion. She exhibits edema (2+ up to knees bilaterally). She exhibits no deformity. Right knee: Tenderness found. 5/5 strength in Bilateral upper and lower extremities. No tenderness over cervical, thoracic and lumbar spines. Right knee has pain with flexion. Neurological: She is alert and oriented to person, place, and time. Skin: Skin is warm and dry.    No abrasions or lacerations   Psychiatric: She has a normal mood and affect. Medical Decision Making   I am the first provider for this patient. I reviewed the vital signs, available nursing notes, past medical history, past surgical history, family history and social history. Old Medical Records: Old medical records. Ambulance run sheet. Provider Notes:   Patient presents after fall with no pain in addition to worsening swelling in her legs and bradycardia. Will get labs to evaluate for CHF, liver failure, ARF, chest xray. No indication for CT head at this time. Will get knee xr to r/o fx. ED Course:  1:59 PM  Initial assessment performed. The patients presenting problems have been discussed with their family, and they are in agreement with the care plan formulated and outlined with them. I have encouraged them to ask questions as they arise throughout their visit. Progress Notes:   3:21 PM  Pt's hemoglobin is 11.3, daughter states she has a hx of iron deficient anemia and on iron pills. Pt denies any hematochezia or melena. CXR read as central congestion, pending other lab work. CONSULT NOTE:  3:57 PM  China Masters M.D spoke with Emeka Ramos MD  Specialty: Cardiology  Discussed patient's hx, disposition, and available diagnostic and imaging results. Reviewed care plans. Consultant agrees with plans as outlined. He will see the pt in the hospital tomorrow. 3:59 PM  Pt's daughter states that the pt's PCP taken off of diltiazem and put on furosemide.     Diagnostic Study Results     Labs -      Recent Results (from the past 12 hour(s))   URINALYSIS W/ REFLEX CULTURE    Collection Time: 11/23/17  2:22 PM   Result Value Ref Range    Color YELLOW/STRAW      Appearance CLEAR CLEAR      Specific gravity 1.023 1.003 - 1.030      pH (UA) 5.5 5.0 - 8.0      Protein NEGATIVE  NEG mg/dL    Glucose NEGATIVE  NEG mg/dL    Ketone NEGATIVE  NEG mg/dL    Bilirubin NEGATIVE  NEG      Blood NEGATIVE  NEG      Urobilinogen 1.0 0.2 - 1.0 EU/dL Nitrites NEGATIVE  NEG      Leukocyte Esterase NEGATIVE  NEG      WBC 0-4 0 - 4 /hpf    RBC 0-5 0 - 5 /hpf    Epithelial cells MODERATE (A) FEW /lpf    Bacteria 1+ (A) NEG /hpf    UA:UC IF INDICATED URINE CULTURE ORDERED (A) CNI      Mucus 1+ (A) NEG /lpf   CBC WITH AUTOMATED DIFF    Collection Time: 11/23/17  2:25 PM   Result Value Ref Range    WBC 7.3 3.6 - 11.0 K/uL    RBC 4.05 3.80 - 5.20 M/uL    HGB 11.3 (L) 11.5 - 16.0 g/dL    HCT 34.9 (L) 35.0 - 47.0 %    MCV 86.2 80.0 - 99.0 FL    MCH 27.9 26.0 - 34.0 PG    MCHC 32.4 30.0 - 36.5 g/dL    RDW 14.5 11.5 - 14.5 %    PLATELET 395 531 - 609 K/uL    NEUTROPHILS 50 32 - 75 %    LYMPHOCYTES 33 12 - 49 %    MONOCYTES 13 5 - 13 %    EOSINOPHILS 4 0 - 7 %    BASOPHILS 0 0 - 1 %    ABS. NEUTROPHILS 3.7 1.8 - 8.0 K/UL    ABS. LYMPHOCYTES 2.4 0.8 - 3.5 K/UL    ABS. MONOCYTES 1.0 0.0 - 1.0 K/UL    ABS. EOSINOPHILS 0.3 0.0 - 0.4 K/UL    ABS. BASOPHILS 0.0 0.0 - 0.1 K/UL   METABOLIC PANEL, COMPREHENSIVE    Collection Time: 11/23/17  2:25 PM   Result Value Ref Range    Sodium 144 136 - 145 mmol/L    Potassium 3.9 3.5 - 5.1 mmol/L    Chloride 110 (H) 97 - 108 mmol/L    CO2 26 21 - 32 mmol/L    Anion gap 8 5 - 15 mmol/L    Glucose 89 65 - 100 mg/dL    BUN 17 6 - 20 MG/DL    Creatinine 0.99 0.55 - 1.02 MG/DL    BUN/Creatinine ratio 17 12 - 20      GFR est AA >60 >60 ml/min/1.73m2    GFR est non-AA 53 (L) >60 ml/min/1.73m2    Calcium 8.7 8.5 - 10.1 MG/DL    Bilirubin, total 0.3 0.2 - 1.0 MG/DL    ALT (SGPT) 33 12 - 78 U/L    AST (SGOT) 36 15 - 37 U/L    Alk.  phosphatase 61 45 - 117 U/L    Protein, total 7.0 6.4 - 8.2 g/dL    Albumin 3.4 (L) 3.5 - 5.0 g/dL    Globulin 3.6 2.0 - 4.0 g/dL    A-G Ratio 0.9 (L) 1.1 - 2.2     NT-PRO BNP    Collection Time: 11/23/17  2:25 PM   Result Value Ref Range    NT pro-BNP 26725 (H) 0 - 450 PG/ML   TROPONIN I    Collection Time: 11/23/17  2:25 PM   Result Value Ref Range    Troponin-I, Qt. 0.04 <0.05 ng/mL       Radiologic Studies -  The following have been ordered and reviewed:  XR KNEE RT 3 V   Final Result   FINDINGS: Three views of the right knee demonstrate no fracture. There is  tricompartmental osteoarthritis, most pronounced in the medial compartment. A  moderate joint effusion is present.     IMPRESSION  IMPRESSION: Tricompartmental osteoarthritis. Moderate joint effusion     CXR Results  (Last 48 hours)               11/23/17 1511  XR CHEST PORT Final result    Impression:  IMPRESSION: Central congestion without overt CHF               Narrative:  EXAM:  XR CHEST PORT       INDICATION:  Daily falls, increased in number. Leg swelling       COMPARISON:  12/20/2015       FINDINGS: A portable AP radiograph of the chest was obtained at 1452 hours. There is central congestion without overt CHF. The marissa is splayed. The lungs   are clear. The cardiac and mediastinal contours and pulmonary vascularity are   normal.  The bones and soft tissues are grossly within normal limits. Vital Signs-Reviewed the patient's vital signs.    Patient Vitals for the past 12 hrs:   Temp Pulse Resp BP SpO2   11/23/17 1700 - 80 19 (!) 173/33 100 %   11/23/17 1629 - 70 - 196/90 -   11/23/17 1600 - (!) 55 18 (!) 134/96 -   11/23/17 1550 - - - - 100 %   11/23/17 1520 - - - - 100 %   11/23/17 1403 98.7 °F (37.1 °C) 67 16 (!) 144/96 96 %       Medications Given in the ED:  Medications   memantine (NAMENDA) tablet 10 mg (not administered)   furosemide (LASIX) injection 40 mg (not administered)   sodium chloride (NS) flush 5-10 mL (not administered)   sodium chloride (NS) flush 5-10 mL (not administered)   acetaminophen (TYLENOL) tablet 650 mg (not administered)   ondansetron (ZOFRAN) injection 4 mg (not administered)   enoxaparin (LOVENOX) injection 40 mg (not administered)   furosemide (LASIX) injection 80 mg (80 mg IntraVENous Given 11/23/17 1629)       Pulse Oximetry Analysis - Normal 95% on RA     Cardiac Monitor:   Rate: 144/96  Rhythm: Normal Sinus Rhythm     EKG interpretation: (Preliminary)  2:15 PM  Rhythm: normal sinus rhythm and PVC's; and regular . Rate (approx.): 69 bpm; Axis: normal; LA interval: normal; QRS interval: normal ; ST/T wave: normal; Other findings: abnormal ekg, left anterior fascicular block. Written by Irving Lemus ED Scribe, as dictated by Eden Sanchez M.D    Diagnosis   Clinical Impression:   1. Acute congestive heart failure, unspecified congestive heart failure type ([address]    2. Bradycardia    3. Falls frequently         Plan:  Admit to Hospitalist    Disposition Note:  ADMIT NOTE:  4:19 PM  Patient is being admitted to the hospital by Dr. Lux Fung. The results of their tests and reasons for their admission have been discussed with them and/or available family. They convey agreement and understanding for the need to be admitted and for their admission diagnosis. Consultation has been made with the inpatient physician specialist for hospitalization. ATTESTATION:  This note is prepared by Irving Lemus, acting as Scribe for Eden Sanchez M.D. Eden Sanchez M.D: The scribe's documentation has been prepared under my direction and personally reviewed by me in its entirety. I confirm that the note above accurately reflects all work, treatment, procedures, and medical decision making performed by me.

## 2017-11-24 LAB
ANION GAP SERPL CALC-SCNC: 7 MMOL/L (ref 5–15)
BACTERIA SPEC CULT: NORMAL
BASOPHILS # BLD: 0 K/UL (ref 0–0.1)
BASOPHILS NFR BLD: 0 % (ref 0–1)
BUN SERPL-MCNC: 16 MG/DL (ref 6–20)
BUN/CREAT SERPL: 16 (ref 12–20)
CALCIUM SERPL-MCNC: 8.6 MG/DL (ref 8.5–10.1)
CC UR VC: NORMAL
CHLORIDE SERPL-SCNC: 108 MMOL/L (ref 97–108)
CO2 SERPL-SCNC: 26 MMOL/L (ref 21–32)
CREAT SERPL-MCNC: 1.01 MG/DL (ref 0.55–1.02)
EOSINOPHIL # BLD: 0.3 K/UL (ref 0–0.4)
EOSINOPHIL NFR BLD: 4 % (ref 0–7)
ERYTHROCYTE [DISTWIDTH] IN BLOOD BY AUTOMATED COUNT: 14.4 % (ref 11.5–14.5)
GLUCOSE SERPL-MCNC: 71 MG/DL (ref 65–100)
HCT VFR BLD AUTO: 35.4 % (ref 35–47)
HGB BLD-MCNC: 11.5 G/DL (ref 11.5–16)
LYMPHOCYTES # BLD: 2.7 K/UL (ref 0.8–3.5)
LYMPHOCYTES NFR BLD: 37 % (ref 12–49)
MAGNESIUM SERPL-MCNC: 1.4 MG/DL (ref 1.6–2.4)
MCH RBC QN AUTO: 27.6 PG (ref 26–34)
MCHC RBC AUTO-ENTMCNC: 32.5 G/DL (ref 30–36.5)
MCV RBC AUTO: 84.9 FL (ref 80–99)
MONOCYTES # BLD: 0.8 K/UL (ref 0–1)
MONOCYTES NFR BLD: 12 % (ref 5–13)
NEUTS SEG # BLD: 3.5 K/UL (ref 1.8–8)
NEUTS SEG NFR BLD: 47 % (ref 32–75)
PHOSPHATE SERPL-MCNC: 3.2 MG/DL (ref 2.6–4.7)
PLATELET # BLD AUTO: 176 K/UL (ref 150–400)
POTASSIUM SERPL-SCNC: 3.6 MMOL/L (ref 3.5–5.1)
RBC # BLD AUTO: 4.17 M/UL (ref 3.8–5.2)
SERVICE CMNT-IMP: NORMAL
SODIUM SERPL-SCNC: 141 MMOL/L (ref 136–145)
WBC # BLD AUTO: 7.3 K/UL (ref 3.6–11)

## 2017-11-24 PROCEDURE — 74011250637 HC RX REV CODE- 250/637: Performed by: INTERNAL MEDICINE

## 2017-11-24 PROCEDURE — 97166 OT EVAL MOD COMPLEX 45 MIN: CPT | Performed by: OCCUPATIONAL THERAPIST

## 2017-11-24 PROCEDURE — 84100 ASSAY OF PHOSPHORUS: CPT | Performed by: INTERNAL MEDICINE

## 2017-11-24 PROCEDURE — G8979 MOBILITY GOAL STATUS: HCPCS

## 2017-11-24 PROCEDURE — G8978 MOBILITY CURRENT STATUS: HCPCS

## 2017-11-24 PROCEDURE — 97116 GAIT TRAINING THERAPY: CPT

## 2017-11-24 PROCEDURE — 74011000250 HC RX REV CODE- 250: Performed by: GENERAL ACUTE CARE HOSPITAL

## 2017-11-24 PROCEDURE — G8987 SELF CARE CURRENT STATUS: HCPCS | Performed by: OCCUPATIONAL THERAPIST

## 2017-11-24 PROCEDURE — 36415 COLL VENOUS BLD VENIPUNCTURE: CPT | Performed by: INTERNAL MEDICINE

## 2017-11-24 PROCEDURE — 74011250636 HC RX REV CODE- 250/636: Performed by: GENERAL ACUTE CARE HOSPITAL

## 2017-11-24 PROCEDURE — 83735 ASSAY OF MAGNESIUM: CPT | Performed by: INTERNAL MEDICINE

## 2017-11-24 PROCEDURE — 97161 PT EVAL LOW COMPLEX 20 MIN: CPT

## 2017-11-24 PROCEDURE — 80048 BASIC METABOLIC PNL TOTAL CA: CPT | Performed by: INTERNAL MEDICINE

## 2017-11-24 PROCEDURE — G8988 SELF CARE GOAL STATUS: HCPCS | Performed by: OCCUPATIONAL THERAPIST

## 2017-11-24 PROCEDURE — 77030038269 HC DRN EXT URIN PURWCK BARD -A

## 2017-11-24 PROCEDURE — 65660000000 HC RM CCU STEPDOWN

## 2017-11-24 PROCEDURE — 93306 TTE W/DOPPLER COMPLETE: CPT

## 2017-11-24 PROCEDURE — 97535 SELF CARE MNGMENT TRAINING: CPT | Performed by: OCCUPATIONAL THERAPIST

## 2017-11-24 PROCEDURE — 74011250636 HC RX REV CODE- 250/636: Performed by: INTERNAL MEDICINE

## 2017-11-24 PROCEDURE — 85025 COMPLETE CBC W/AUTO DIFF WBC: CPT | Performed by: INTERNAL MEDICINE

## 2017-11-24 RX ORDER — LATANOPROST 50 UG/ML
1 SOLUTION/ DROPS OPHTHALMIC EVERY EVENING
Status: DISCONTINUED | OUTPATIENT
Start: 2017-11-24 | End: 2017-11-27 | Stop reason: HOSPADM

## 2017-11-24 RX ORDER — BRIMONIDINE TARTRATE 2 MG/ML
1 SOLUTION/ DROPS OPHTHALMIC EVERY 12 HOURS
Status: DISCONTINUED | OUTPATIENT
Start: 2017-11-24 | End: 2017-11-27 | Stop reason: HOSPADM

## 2017-11-24 RX ORDER — BRIMONIDINE TARTRATE, TIMOLOL MALEATE 2; 5 MG/ML; MG/ML
1 SOLUTION/ DROPS OPHTHALMIC EVERY 12 HOURS
Status: DISCONTINUED | OUTPATIENT
Start: 2017-11-24 | End: 2017-11-24 | Stop reason: SDUPTHER

## 2017-11-24 RX ORDER — FUROSEMIDE 10 MG/ML
40 INJECTION INTRAMUSCULAR; INTRAVENOUS DAILY
Status: DISCONTINUED | OUTPATIENT
Start: 2017-11-25 | End: 2017-11-25

## 2017-11-24 RX ORDER — MAGNESIUM SULFATE HEPTAHYDRATE 40 MG/ML
2 INJECTION, SOLUTION INTRAVENOUS ONCE
Status: COMPLETED | OUTPATIENT
Start: 2017-11-24 | End: 2017-11-24

## 2017-11-24 RX ORDER — TIMOLOL MALEATE 5 MG/ML
1 SOLUTION/ DROPS OPHTHALMIC EVERY 12 HOURS
Status: DISCONTINUED | OUTPATIENT
Start: 2017-11-24 | End: 2017-11-27 | Stop reason: HOSPADM

## 2017-11-24 RX ADMIN — TIMOLOL MALEATE 1 DROP: 5 SOLUTION/ DROPS OPHTHALMIC at 20:18

## 2017-11-24 RX ADMIN — TIMOLOL MALEATE 1 DROP: 5 SOLUTION/ DROPS OPHTHALMIC at 12:55

## 2017-11-24 RX ADMIN — BRIMONIDINE TARTRATE 1 DROP: 2 SOLUTION/ DROPS OPHTHALMIC at 12:56

## 2017-11-24 RX ADMIN — FUROSEMIDE 40 MG: 10 INJECTION, SOLUTION INTRAMUSCULAR; INTRAVENOUS at 09:24

## 2017-11-24 RX ADMIN — Medication 10 ML: at 09:24

## 2017-11-24 RX ADMIN — Medication 10 ML: at 16:51

## 2017-11-24 RX ADMIN — MEMANTINE 10 MG: 10 TABLET ORAL at 17:28

## 2017-11-24 RX ADMIN — ACETAMINOPHEN 650 MG: 325 TABLET ORAL at 12:55

## 2017-11-24 RX ADMIN — MAGNESIUM SULFATE HEPTAHYDRATE 2 G: 40 INJECTION, SOLUTION INTRAVENOUS at 12:56

## 2017-11-24 RX ADMIN — ENOXAPARIN SODIUM 40 MG: 40 INJECTION SUBCUTANEOUS at 21:00

## 2017-11-24 RX ADMIN — Medication 10 ML: at 20:21

## 2017-11-24 RX ADMIN — LATANOPROST 1 DROP: 50 SOLUTION OPHTHALMIC at 17:29

## 2017-11-24 RX ADMIN — Medication 10 ML: at 06:00

## 2017-11-24 RX ADMIN — BRIMONIDINE TARTRATE 1 DROP: 2 SOLUTION/ DROPS OPHTHALMIC at 20:18

## 2017-11-24 RX ADMIN — ACETAMINOPHEN 650 MG: 325 TABLET ORAL at 18:48

## 2017-11-24 RX ADMIN — MEMANTINE 10 MG: 10 TABLET ORAL at 09:24

## 2017-11-24 NOTE — PROGRESS NOTES
Problem: Mobility Impaired (Adult and Pediatric)  Goal: *Acute Goals and Plan of Care (Insert Text)  Physical Therapy Goals  Initiated 11/24/2017  1. Patient will move from supine to sit and sit to supine  in bed with independence within 7 day(s). 2.  Patient will transfer from bed to chair and chair to bed with modified independence using the least restrictive device within 7 day(s). 3.  Patient will perform sit to stand with modified independence within 7 day(s). 4.  Patient will ambulate with supervision/set-up for 150 feet with the least restrictive device within 7 day(s). 5.  Patient will ascend/descend 4 stairs with one handrail(s) with minimal assistance/contact guard assist within 7 day(s). physical Therapy EVALUATION  Patient: Lionel Galeana (40 y.o. female)  Date: 11/24/2017  Primary Diagnosis: Acute CHF (congestive heart failure) ([facility] [address]        Precautions: Fall       ASSESSMENT :  Based on the objective data described below, the patient presents with baseline dementia, impaired balance, decreased strength, and decreased activity tolerance limiting patient's safe functional mobility. Patient lives with spouse and ambulates mostly without AD at baseline. Daughter present and reports patient has frequent falls at home and does not remember to use her RW though it has been recommended. Patient able to perform supine to sit with SBA, then stood with CGA. Patient ambulated 20 ft using RW with min assist mostly for RW management. Patient returned to bed at end of session. No dizziness reported and BP stable with positional changes. Family expressed concerns regarding history of multiple falls at home and feel patient would benefit from SNF at discharge. Will continue to follow to progress mobility while patient remains in hospital. Recommend SNF for increasing strength, balance, endurance, and safety prior to returning home with elderly spouse.      Patient will benefit from skilled intervention to address the above impairments. Patients rehabilitation potential is considered to be Fair  Factors which may influence rehabilitation potential include:   []         None noted  [x]         Mental ability/status  []         Medical condition  [x]         Home/family situation and support systems  [x]         Safety awareness  []         Pain tolerance/management  []         Other:      PLAN :  Recommendations and Planned Interventions:  [x]           Bed Mobility Training             []    Neuromuscular Re-Education  [x]           Transfer Training                   []    Orthotic/Prosthetic Training  [x]           Gait Training                         []    Modalities  [x]           Therapeutic Exercises           []    Edema Management/Control  [x]           Therapeutic Activities            [x]    Patient and Family Training/Education  []           Other (comment):    Frequency/Duration: Patient will be followed by physical therapy  4 times a week to address goals. Discharge Recommendations: Skilled Nursing Facility  Further Equipment Recommendations for Discharge: none     SUBJECTIVE:   Patient stated It feels good to walk.     OBJECTIVE DATA SUMMARY:   HISTORY:    Past Medical History:   Diagnosis Date    Anemia     Arthritis     Chronic pain     all over due to arthritis/knees/back/hand    DEMENTIA     Glaucoma     Hypertension     Other ill-defined conditions(799.89)     hiatal hernia    Other ill-defined conditions(799.89)     glaucoma    Other ill-defined conditions(799.89)     diverticulosis    Other ill-defined conditions(799.89)     2 cysts in arms    Other ill-defined conditions(799.89)     intestional blockage    Psychiatric disorder     dementia/anxiety    Stroke [facility])     mini stroke     Past Surgical History:   Procedure Laterality Date    HX GYN      surgery to removed  fetus (abdominal incision)    HX LAP CHOLECYSTECTOMY      HX ORTHOPAEDIC      cyst removed from right arm - benign     Prior Level of Function/Home Situation: history of multiple falls, does not remember to use RW at home. Lives with spouse and daughter comes over to help as needed. Personal factors and/or comorbidities impacting plan of care:     Home Situation  Home Environment: Private residence  # Steps to Enter: 4  Rails to Enter: Yes  One/Two Story Residence: Two story, live on 1st floor  Living Alone: No  Support Systems: Child(pierre), Family member(s), Spouse/Significant Other/Partner  Patient Expects to be Discharged to[de-identified] Unknown  Current DME Used/Available at Home: Gab Xi, straight, Shower chair, Walker, rolling    EXAMINATION/PRESENTATION/DECISION MAKING:   Critical Behavior:  Neurologic State: Alert, Confused  Orientation Level: Disoriented to situation, Disoriented to time, Oriented to person, Oriented to place  Cognition: Decreased attention/concentration, Follows commands  Safety/Judgement: Decreased awareness of environment  Hearing: Auditory  Auditory Impairment: Hard of hearing, bilateral  Skin:  Intact   Edema: bilateral LE edema   Range Of Motion:  AROM: Within functional limits           PROM: Within functional limits           Strength:    Strength: Generally decreased, functional                    Tone & Sensation:   Tone: Normal              Sensation: Intact               Coordination:  Coordination: Within functional limits  Vision:      Functional Mobility:  Bed Mobility:     Supine to Sit: Stand-by asssistance; Additional time  Sit to Supine: Minimum assistance; Additional time;Assist x1     Transfers:  Sit to Stand: Contact guard assistance; Additional time  Stand to Sit: Contact guard assistance                       Balance:   Sitting: Intact; With support  Standing: Impaired; Without support  Standing - Static: Fair  Standing - Dynamic : Fair  Ambulation/Gait Training:  Distance (ft): 20 Feet (ft)  Assistive Device: Gait belt;Walker, rolling  Ambulation - Level of Assistance: Minimal assistance;Assist x1 (for occasional RW management)     Gait Description (WDL): Exceptions to WDL  Gait Abnormalities: Decreased step clearance        Base of Support: Narrowed     Speed/Anca: Shuffled; Slow  Step Length: Left shortened;Right shortened            Functional Measure:  Barthel Index:    Bathin  Bladder: 0  Bowels: 0  Groomin  Dressin  Feedin  Mobility: 0  Stairs: 0  Toilet Use: 5  Transfer (Bed to Chair and Back): 10  Total: 30       Barthel and G-code impairment scale:  Percentage of impairment CH  0% CI  1-19% CJ  20-39% CK  40-59% CL  60-79% CM  80-99% CN  100%   Barthel Score 0-100 100 99-80 79-60 59-40 20-39 1-19   0   Barthel Score 0-20 20 17-19 13-16 9-12 5-8 1-4 0      The Barthel ADL Index: Guidelines  1. The index should be used as a record of what a patient does, not as a record of what a patient could do. 2. The main aim is to establish degree of independence from any help, physical or verbal, however minor and for whatever reason. 3. The need for supervision renders the patient not independent. 4. A patient's performance should be established using the best available evidence. Asking the patient, friends/relatives and nurses are the usual sources, but direct observation and common sense are also important. However direct testing is not needed. 5. Usually the patient's performance over the preceding 24-48 hours is important, but occasionally longer periods will be relevant. 6. Middle categories imply that the patient supplies over 50 per cent of the effort. 7. Use of aids to be independent is allowed. Wyatt Bolanos., Barthel, D.W. (5021). Functional evaluation: the Barthel Index. [address] [facility] (14)2. JULEE Betancur, Mary Tolbert., [facility]. [place], [address] Measuring the change indisability after inpatient rehabilitation; comparison of the responsiveness of the Barthel Index and Functional [place] Measure.  Journal of Neurology, Neurosurgery, and Psychiatry, 66(4), 374-394. TONG Soto, JEREMIAS Roper, & Marvin Thomason M.A. (2004.) Assessment of post-stroke quality of life in cost-effectiveness studies: The usefulness of the Barthel Index and the EuroQoL-5D. Quality of Life Research, 13, 295-21       G codes: In compliance with CMSs Claims Based Outcome Reporting, the following G-code set was chosen for this patient based on their primary functional limitation being treated: The outcome measure chosen to determine the severity of the functional limitation was the Barthel Index with a score of 30/100 which was correlated with the impairment scale. ? Mobility - Walking and Moving Around:     - CURRENT STATUS: CL - 60%-79% impaired, limited or restricted    - GOAL STATUS: CK - 40%-59% impaired, limited or restricted    - D/C STATUS:  ---------------To be determined---------------       Pain:  Pain Scale 1: Numeric (0 - 10)  Pain Intensity 1: 0              Activity Tolerance:   Vitals:    11/24/17 1131 11/24/17 1333 11/24/17 1335 11/24/17 1340   BP: 129/68 115/59 121/70 117/79   BP 1 Location: Right arm Right arm Right arm Right arm   BP Patient Position: At rest Supine Sitting Standing   Pulse: 71      Resp: 18      Temp: 97.7 °F (36.5 °C)      SpO2: 100%      Weight:       Height:         Please refer to the flowsheet for vital signs taken during this treatment. After treatment:   []         Patient left in no apparent distress sitting up in chair  [x]         Patient left in no apparent distress in bed  [x]         Call bell left within reach  [x]         Nursing notified  [x]         Caregiver present  [x]         Bed alarm activated    COMMUNICATION/EDUCATION:   The patients plan of care was discussed with: Registered Nurse. [x]         Fall prevention education was provided and the patient/caregiver indicated understanding.   [x]         Patient/family have participated as able in goal setting and plan of care. [x]         Patient/family agree to work toward stated goals and plan of care. []         Patient understands intent and goals of therapy, but is neutral about his/her participation. []         Patient is unable to participate in goal setting and plan of care.     Thank you for this referral.  Osbaldo Hardin, PT   Time Calculation: 23 mins

## 2017-11-24 NOTE — PROGRESS NOTES
Hospitalist Progress Note    NAME: Alvin Roe   :  1931   MRN:  [id number]       Assessment / Plan:  Acute CHF (unknown EF)  Bradycardia  -leg swelling; elevated proBNP 12K; CXR central congestion  -Continue Telemetry monitoring - no clear indication for pacemaker  -Echo pending  -Lasix dose reduced by Cardiology - appreciate recs  -Daily weight  -Monitor UOP, 24 hour 1800mL    Frequent falls  Osteoarthritis  -xray R knee Tricompartmental osteoarthritis. Moderate joint effusion  -PT OT eval and treat. May benefit from SNF rehab    HypoMag  -Replete, monitor     Dementia  -mood stable. Continue namenda     TIA  -continue ASA     Glaucoma    Body mass index is 24.94 kg/(m^2). Code status: Full  Prophylaxis: Lovenox  Recommended Disposition: TBD     Subjective:     Chief Complaint / Reason for Physician Visit  \"I am okay\". Discussed with RN events overnight. Review of Systems:  Symptom Y/N Comments  Symptom Y/N Comments   Fever/Chills    Chest Pain     Poor Appetite    Edema     Cough    Abdominal Pain     Sputum    Joint Pain     SOB/CARRERO    Pruritis/Rash     Nausea/vomit    Tolerating PT/OT     Diarrhea    Tolerating Diet     Constipation    Other       Could NOT obtain due to: Denies     Objective:     VITALS:   Last 24hrs VS reviewed since prior progress note.  Most recent are:  Patient Vitals for the past 24 hrs:   Temp Pulse Resp BP SpO2   17 0755 98.4 °F (36.9 °C) 75 18 151/84 -   17 0335 98.1 °F (36.7 °C) 74 18 160/60 100 %   17 0020 98 °F (36.7 °C) 85 18 170/79 100 %   17 2000 98.3 °F (36.8 °C) 85 18 175/80 100 %   17 1812 97.7 °F (36.5 °C) (!) 104 16 181/77 99 %   17 1700 - 80 19 (!) 173/33 100 %   17 1629 - 70 - 196/90 -   17 1600 - (!) 55 18 (!) 134/96 -   17 1550 - - - - 100 %   17 1520 - - - - 100 %   17 1403 98.7 °F (37.1 °C) 67 16 (!) 144/96 96 %       Intake/Output Summary (Last 24 hours) at 17 [facility] filed at 11/24/17 0647   Gross per 24 hour   Intake               50 ml   Output             1850 ml   Net            -1800 ml        PHYSICAL EXAM:  General: Awake, alert, cooperative    EENT:  EOMI. Anicteric sclerae. MMM  Resp:  Bibasilar crackles  CV:  Regular  rhythm,  +LE edema  GI:  Soft, Non distended, Non tender.  +Bowel sounds  Neurologic:  Alert and oriented X 1, normal speech, no focal deficits   Psych:   Poor insight. Not anxious nor agitated  Skin:  No rashes. No jaundice    Reviewed most current lab test results and cultures  YES  Reviewed most current radiology test results   YES  Review and summation of old records today    NO  Reviewed patient's current orders and MAR    YES  PMH/SH reviewed - no change compared to H&P  ________________________________________________________________________  Care Plan discussed with:    Comments   Patient y    [facility] y    Consultant                        Multidiciplinary team rounds were held today with , nursing, pharmacist and clinical coordinator. Patient's plan of care was discussed; medications were reviewed and discharge planning was addressed. ________________________________________________________________________  Total NON critical care TIME:  35   Minutes    Total CRITICAL CARE TIME Spent:   Minutes non procedure based      Comments   >50% of visit spent in counseling and coordination of care y    ________________________________________________________________________  Raoul Simmonds, MD     Procedures: see electronic medical records for all procedures/Xrays and details which were not copied into this note but were reviewed prior to creation of Plan. LABS:  I reviewed today's most current labs and imaging studies.   Pertinent labs include:  Recent Labs      11/24/17   0308  11/23/17   1425   WBC  7.3  7.3   HGB  11.5  11.3*   HCT  35.4  34.9*   PLT  176  178     Recent Labs      11/24/17 0308  11/23/17   1425   NA  141  144   K  3.6  3.9   CL  108  110*   CO2  26  26   GLU  71  89   BUN  16  17   CREA  1.01  0.99   CA  8.6  8.7   MG  1.4*   --    PHOS  3.2   --    ALB   --   3.4*   TBILI   --   0.3   SGOT   --   36   ALT   --   33       Signed: Cuca Gallegos MD

## 2017-11-24 NOTE — PROGRESS NOTES
Problem: Self Care Deficits Care Plan (Adult)  Goal: *Acute Goals and Plan of Care (Insert Text)  Occupational Therapy Goals  Initiated 11/24/2017  1. Patient will perform grooming in standing  With minimal cues and  supervision/set-up within 7 day(s). 2.  Patient will perform bathing with minimal assistance/contact guard assist within 7 day(s). 3.  Patient will perform upper body dressing and lower body dressing with minimal assistance/contact guard assist within 7 day(s). 4.  Patient will perform toilet transfers with supervision/set-up within 7 day(s). 5.  Patient will perform all aspects of toileting with minimal assistance/contact guard assist within 7 day(s). 6.  Patient will tolerate at least 8 minutes of standing adls with supervision within 7 days. Occupational Therapy EVALUATION  Patient: Brielle Zhou (64 y.o. female)  Date: 11/24/2017  Primary Diagnosis: Acute CHF (congestive heart failure) ([facility])        Precautions: fall       ASSESSMENT :  Based on the objective data described below, the patient presents with baseline significant dementia, history of falls, pain from arthritis (hips and knees), need for assistance for adls/IADLs at baseline impairing adls and functional mobility. Pt is unable to report PLOF-her daughter/caregiver was present and provided baseline information. Pt is functioning at supervision to maximal assistance levels for self care and is CGA for functional mobility. She is likely close to her baseline level for self care. Pt doesn't always use the RW at home, needs reminders to. Pt will continue to need 24 hour assistance and assistance for self care. She may benefit from SNF rehab at discharge, however, she may be most comfortable in her familiar home environment with increased caregiver support. Pt's daughter will likely need increased assistance from additional caregivers to manage pt and her  (per daughter has mild dementia) at home.   .    Patient will benefit from skilled intervention to address the above impairments. Patients rehabilitation potential is considered to be Guarded  Factors which may influence rehabilitation potential include:   []             None noted  [x]             Mental ability/status--baseline dementia  []             Medical condition  []             Home/family situation and support systems  [x]             Safety awareness  []             Pain tolerance/management  []             Other:      PLAN :  Recommendations and Planned Interventions:  [x]               Self Care Training                  [x]        Therapeutic Activities  [x]               Functional Mobility Training    [x]        Cognitive Retraining  [x]               Therapeutic Exercises           [x]        Endurance Activities  [x]               Balance Training                   [x]        Neuromuscular Re-Education  [x]               Visual/Perceptual Training     [x]   Home Safety Training  [x]               Patient Education                 [x]        Family Training/Education  []               Other (comment):    Frequency/Duration: Patient will be followed by occupational therapy 3 times a week to address goals. Discharge Recommendations: [address] with additional caregiver services. Would benefit from therapist skilled in dealing with dementia patients at home   Further Equipment Recommendations for Discharge: tbd     SUBJECTIVE:   Patient stated I'm cold.     OBJECTIVE DATA SUMMARY:   HISTORY:   Past Medical History:   Diagnosis Date    Anemia     Arthritis     Chronic pain     all over due to arthritis/knees/back/hand    DEMENTIA     Glaucoma     Hypertension     Other ill-defined conditions(799.89)     hiatal hernia    Other ill-defined conditions(799.89)     glaucoma    Other ill-defined conditions(799.89)     diverticulosis    Other ill-defined conditions(799.89)     2 cysts in arms    Other ill-defined conditions(799.89) intestional blockage    Psychiatric disorder     dementia/anxiety    Stroke [facility])     mini stroke     Past Surgical History:   Procedure Laterality Date    HX GYN      surgery to removed  fetus (abdominal incision)    HX LAP CHOLECYSTECTOMY      HX ORTHOPAEDIC      cyst removed from right arm - benign       Prior Level of Function/Environment/Context: Per pt's daughter. Pt lives with her .  has dementia, but to lesser extent per daughter's report. Daughter has been a caregiver to pt,but works full time. Daughter's friend has also been assisting as able, but also works full time. Pt needs assistance for self care--bathing, dressing,  etc. Due to memory loss, inattention, and decreased sequencing due to dementia diagnosis. Pt generally sleeps at night, but has been up moving around at times from room to room. Pt has not wandered outside of the home. Pt does have RW, but generally doesn't use as she forgets about it. Pt is generally incontinent at home, however, daughter states she must assist pt with wearing depends garment (pt generally declines them). She needs supervision with feeding self and drinking for appropriate fluid intake. Needs cues for all adls to stay on task, additional time, as well as assistance. Daughter reports that she has been frustrated with trying to obtain (reobtain) medicaid for patient. She may benefit from consultation with  for assistance in the process/paperwork. Occupations in which the patient is/was successful, what are the barriers preventing that success: Dementia impairs  Performance Patterns (routines, roles, habits, and rituals): pt's dementia impairs attention, sequencing, initiation/completion of tasks      Expanded or extensive additional review of patient history: Per medical record, last seen in   Where pt needed assistance for self care.     Home Situation  Home Environment: Private residence  # Steps to Enter: 4  Rails to Enter: Yes  One/Two Story Residence: Two story, live on 1st floor  Living Alone: No  Support Systems: Child(pierre), Family member(s), Spouse/Significant Other/Partner  Patient Expects to be Discharged to[de-identified] Unknown  Current DME Used/Available at Home: [address], [place], straight, Shower chair, Walker, rolling  Tub or Shower Type: Tub/Shower combination  [x]  Right hand dominant   []  Left hand dominant    EXAMINATION OF PERFORMANCE DEFICITS:  Cognitive/Behavioral Status:  Neurologic State: Alert;Confused  Orientation Level: Oriented to person  Cognition: Decreased attention/concentration; Follows commands; Impaired decision making;Memory loss;Poor safety awareness  Perception:  (impaired due to [employer]. awareness)  Perseveration: No perseveration noted  Safety/Judgement: Decreased awareness of environment;Decreased awareness of need for assistance;Decreased awareness of need for safety;Decreased insight into deficits; Fall prevention;Home safety    Skin: generally intact,  Warm and pink RLE compared to LLE    Edema: BLEs    Hearing: Auditory  Auditory Impairment: Hard of hearing, bilateral    Vision/Perceptual:                   Visual Fields:  (decreased environmentalscanning/keeps head down/fixed)       Acuity: Impaired near vision (not formally tested)    Corrective Lenses: Reading glasses    Range of Motion:  BUEs:  Generally decreased  AROM: Within functional limits  PROM: Within functional limits                      Strength:  BUEs:  Strength: Generally decreased, functional                Coordination:  Coordination: Within functional limits  Fine Motor Skills-Upper: Left Intact; Right Intact    Gross Motor Skills-Upper: Left Intact; Right Intact    Tone & Sensation:    Tone: Normal  Sensation: Intact--not formally tested                      Balance:  Sitting: Intact  Standing: Impaired  Standing - Static: Constant support;Good  Standing - Dynamic : Fair (decreased awareness of body position in space related to furniture--needs maximal cues for aligning self )    Functional Mobility and Transfers for ADLs:  Bed Mobility:  Supine to Sit: Stand-by asssistance  Sit to Supine: Minimum assistance; Additional time;Assist x1    Transfers:  Sit to Stand: Contact guard assistance  Stand to Sit: Contact guard assistance (max cues for aligning self with chair)  Bed to Chair: Contact guard assistance    ADL Assessment:  Feeding: Supervision;Stand-by assistance; Additional time    Oral Facial Hygiene/Grooming: Supervision;Stand-by assistance; Additional time    Bathing: Maximum assistance; Additional time (cues to attend to task)    Upper Body Dressing: Maximum assistance    Lower Body Dressing: Moderate assistance    Toileting: Moderate assistance                ADL Intervention and task modifications:   Pt performed bed mobility. Was incontinent of urine upon standing. BP monitored--fluctuated, but no symptoms and generallystable above 560 systolically. Pt able to assist once set up with pericare post incontinence requiring mod to max assistance in standing position. No complaints of standing for 7 minutes using RW for support. Pt requires constant support and maximal cues for completing functional mobiltiy safely using  the RW. Pt required maximal assistance for changing out clothing. Suggest wearing protective brief next session and working on standing grooming/ADLs next session. Cognitive Retraining  Safety/Judgement: Decreased awareness of environment;Decreased awareness of need for assistance;Decreased awareness of need for safety;Decreased insight into deficits; Fall prevention;Home safety    Therapeutic Exercise:  Encouraged pt to be OOB as much as possible with assistance and up in chair for meals, to bathroom with nursing   's assistance using the RW   Functional Measure:  Barthel Index:    Bathin  Bladder: 0  Bowels: 5  Groomin  Dressin  Feedin  Mobility: 0  Stairs: 0  Toilet Use: 5  Transfer (Bed to Chair and Back): 10  Total: 30       Barthel and G-code impairment scale:  Percentage of impairment CH  0% CI  1-19% CJ  20-39% CK  40-59% CL  60-79% CM  80-99% CN  100%   Barthel Score 0-100 100 99-80 79-60 59-40 20-39 1-19   0   Barthel Score 0-20 20 17-19 13-16 9-12 5-8 1-4 0      The Barthel ADL Index: Guidelines  1. The index should be used as a record of what a patient does, not as a record of what a patient could do. 2. The main aim is to establish degree of independence from any help, physical or verbal, however minor and for whatever reason. 3. The need for supervision renders the patient not independent. 4. A patient's performance should be established using the best available evidence. Asking the patient, friends/relatives and nurses are the usual sources, but direct observation and common sense are also important. However direct testing is not needed. 5. Usually the patient's performance over the preceding 24-48 hours is important, but occasionally longer periods will be relevant. 6. Middle categories imply that the patient supplies over 50 per cent of the effort. 7. Use of aids to be independent is allowed. Link ., Barthel, D.W. ([id number]). Functional evaluation: the Barthel Index. [facility] (14)2. JULEE Tracy, Medardo Bassett., Madelaine Sinclair., [place], [address] [place] [address] (1999). Measuring the change indisability after inpatient rehabilitation; comparison of the responsiveness of the Barthel Index and Functional [place] Measure. Journal of Neurology, Neurosurgery, and Psychiatry, 66(4), 442-429. Leticia Enriquez, N.J.A, Za Corea,  W.J.M, & Yarelis Gil M.A. (2004.) Assessment of post-stroke quality of life in cost-effectiveness studies: The usefulness of the Barthel Index and the EuroQoL-5D. Quality of Life Research, 13, 351-07       G codes:   In compliance with CMSs Claims Based Outcome Reporting, the following G-code set was chosen for this patient based on their primary functional limitation being treated: The outcome measure chosen to determine the severity of the functional limitation was the BArthel Index with a score of 30/100 which was correlated with the impairment scale. ? Self Care:     - CURRENT STATUS: CL - 60%-79% impaired, limited or restricted    - GOAL STATUS: CK - 40%-59% impaired, limited or restricted    - D/C STATUS:  ---------------To be determined---------------     Occupational Therapy Evaluation Charge Determination   History Examination Decision-Making   MEDIUM Complexity : Expanded review of history including physical, cognitive and psychosocial  history  MEDIUM Complexity : 3-5 performance deficits relating to physical, cognitive , or psychosocial skils that result in activity limitations and / or participation restrictions MEDIUM Complexity : Patient may present with comorbidities that affect occupational performnce. Miniml to moderate modification of tasks or assistance (eg, physical or verbal ) with assesment(s) is necessary to enable patient to complete evaluation       Based on the above components, the patient evaluation is determined to be of the following complexity level: MEDIUM  Pain:       no complaints of pain. At baseline discomfort in hips and knees (bone on bone in knees per daughter)              Activity Tolerance:   VS taking throughout all positions and were fluctuating but all above 801 systolically and pt had no complaints  Please refer to the flowsheet for vital signs taken during this treatment. After treatment:   [x] Patient left in no apparent distress sitting up in chair  [] Patient left in no apparent distress in bed  [x] Call bell left within reach  [x] Nursing notified  [x] Caregiver present  [x] Bed alarm activated    COMMUNICATION/EDUCATION:   The patients plan of care was discussed with: Physical Therapist and Registered Nurse.   [x] Home safety education was provided and the caregiver indicated understanding. [x] family have participated as able in goal setting and plan of care. [] Patient/family agree to work toward stated goals and plan of care. [] Patient understands intent and goals of therapy, but is neutral about his/her participation. [x] Patient is unable to participate in goal setting and plan of care. This patients plan of care is appropriate for delegation to [facility].     Thank you for this referral.  Shonna Carlos, OTR/L  Time Calculation: 63 mins

## 2017-11-24 NOTE — ROUTINE PROCESS
Patient has been incontinent of urine with frequency and urgency. purwick applied. Patient on bed alarm. Family in the room.

## 2017-11-24 NOTE — PROGRESS NOTES
Cardiology Progress Note  11/24/2017     Admit Date: 11/23/2017  Admit Diagnosis: Acute CHF (congestive heart failure) (HCC)  CC: none currently  Cardiac Assessment/Plan:   Recurrent falls: doesn't sound cardiac per se but needs further tele monitoring (will need outpt event monitor if no events here) and monitoring of orthostatic BPs. ? acute diastolic CHF: echo pending; will review; Cont diuresis of LE edema (clear lungs currently after IV lasix in ER): will decrease to IV lasix 40 qday. HTN: stable so far (needs orthostatic monitoring). Rhythm: sinus/sinus dawood with PVCs: no current indication for PPM (her daughter is willing to consider PPM if needed to prevent falls). Renal function: stable    Dementia, Falls, Dispo: all per primary team.   For other plans, see orders. As per H/P: \"86 y.o.  female with a history of HTN, dementia, TIA and OA who presents via EMS to the ER because of frequent falls. Daughter reports that the patient has fallen 4 times in the last 1.5 weeks. Patient is not clear on why falls. Sounds like her legs just gave out as she denies dizziness, vertigo or LOC. She endorses leg swelling x 2 weeks but denies SOB, CP or orthopnea. Her PCP just recently increased her lasix dose. EMS found patient bradycardic in the 40-50s. ER evaluation demonstrates elevated proBNP 12K and cxr with central congestion\"    Also, apparently dilt was d/cd by PCP a days PTA.  ________________________________________________________________________  @ last OV 8/11/16:       Ms Gerri Cifuentes has a h/o HTN, intermittent vertigo/ataxia (neg MRI 2014 except extensive WMD) and significant dementia; Her daughter Juanita Mata, pt of mine) cares for her and her  at night. The pt cannot participate in medical decision making; her daughter would like a non-invasive approach to care. 5/2016: Intermittent vertigo noted; No angina/CARRERO.  Her HR was reportedly palpated at 38 on 4/27 PCP visit: ecg from then shows HR in 70s with PVC. No current complaints: mild carotid disease; echo unremarkable. 8/2016: No Sx; Dilt and aldactone decreased by PCP; dose unknown (?CKD). Did not supply current meds list, will call with updated meds. IMPRESSION AND PLAN  01. Bradycardia:  Neg holter 5/2016: no Rx needed at this point. ECG done today   02. Occlusion and stenosis of bilateral carotid arteries:  Mild carotid disease; repeat next year. Carotid Duplex to be done as specified in the orders. 03. Hypertensive heart disease without heart failure:  Adequately controlled. 04. 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   05. Long term (current) use of aspirin:  This condition is stable. 06. Personal history of nicotine dependence:  remains abstinent from tobacco use. 07. Body mass index (BMI) 28.0-28.9, adult         ORDERS:  1 ECG done today   2 Carotid Duplex in 1 Year. 3 Giving encouragement to exercise   4 Dietary management education, guidance, and counseling   5 Return office visit with Angélica Dailey MD in 1 Year.      8/11/16 MEDICATION LIST    Medication Sig Description   aspirin 325 mg tablet take 1 tablet by oral route  every day   Cartia  mg capsule,extended release changed by PCP   Glucosamine 500 mg tablet as directed   iron  mg (65 mg iron) capsule,extended release take 1 by Oral route  every day   Klor-Con 8 mEq tablet,extended release take 1 tablet by oral route  every day with food   Namenda XR 14 mg capsule sprinkle,extended release take 1 capsule by oral route  every day   spironolactone 50 mg tablet changed by PCP   Tylenol 325 mg tablet take 2 tablet by oral route  every 4 hours as needed   vitamin B complex tablet 1 po qd   Vitamin C 500 mg tablet one po qd     __________________________________________________________________________________________  CARDIAC HISTORY  ARRHYTHMIA:  1 Bradycardia [Neg holter.] - 2016     RISK FACTORS:  1 Hypertension       CARDIOVASCULAR PROCEDURES  ECHO/MUGA:  Echo (EF 0.55 (55%), Mild LVH, Mild TR, Mild AR, Mild MR, LA Diam 4.4 cm, Est RVSP 41 mmHg, Normal RV.) - 2016   ELECTROPHYSIOLOGY:  EKG (Occasional PVCs, Sinus Rhythm, LAFB, poor r wave progression. No change.) - 2016   Holter (Sinus Rhythm, No Malignant Arrhythmias, (55-91, ave 71); no significant bradycardia); Incidental ASx 6 beats PAT. ) - 2016   VASCULAR:  Carotid Duplex (1 - 49% Bilateral ICAs, Vertebral: Bilateral Antegrade Flow) - 2016       ALLERGIES/INTOLERANCES:    Ingredient Reaction Medication Name Comment   NO KNOWN ALLERGIES        None    PROBLEM LIST:    Problem Description Chronic   Benign essential HTN Y   Bradycardia Y         PAST MEDICAL/SURGICAL HISTORY  (Detailed)    Disease/disorder Onset Date Management Date Comments     Appendectomy     cyst on arm       dementia       Hypertension             Family History  (Detailed)    Relationship Family Member Name  Age at Death Condition Onset Age Cause of Death   Father  Y  heart issues  Y   Mother  Y  Asthma  N   SOCIAL HISTORY  (Detailed)  Tobacco use reviewed. Preferred language is Sheyla Phlegm. EDUCATION/EMPLOYMENT/OCCUPATION    Employment History Status Retired Restrictions     retired       retired       retired     Smoking status: Former smoker. SMOKING STATUS  Use Status Type Smoking Status Usage Per Day Years Used Total Pack Years   yes  Former smoker            Hospital problem list   C/Sai Thakkar 1106 Problems    Diagnosis Date Noted    Acute CHF (congestive heart failure) ([facility] [address] 2017        Subjective:  Fariba Lamas reports   Chest pain X none  consistent with:  Non-cardiac CP         Atypical CP     None now  On going  Anginal CP     Dyspnea X none  at rest  with exertion         improved  unchanged  worse              PND X none  overnight       Orthopnea X none  improved  unchanged  worse   Presyncope X none  improved  unchanged  worse     Ambulated in hallway without symptoms   Yes   Ambulated in room without symptoms  Yes   Objective:    Physical Exam:  Overall VSSAF;    Visit Vitals    /84 (BP 1 Location: Right arm, BP Patient Position: At rest)    Pulse 75    Temp 98.4 °F (36.9 °C)    Resp 18    Ht 5' 5\" (1.651 m)    Wt 68 kg (149 lb 14.4 oz)    SpO2 100%    BMI 24.94 kg/m2     Temp (24hrs), Av.2 °F (36.8 °C), Min:97.7 °F (36.5 °C), Max:98.7 °F (37.1 °C)    Patient Vitals for the past 8 hrs:   Pulse   17 0755 75   17 0335 74    Patient Vitals for the past 8 hrs:   Resp   17 0755 18   17 0335 18    Patient Vitals for the past 8 hrs:   BP   17 0755 151/84   17 0335 160/60        Intake/Output Summary (Last 24 hours) at 17 0837  Last data filed at 17 0647   Gross per 24 hour   Intake               50 ml   Output             1850 ml   Net            -1800 ml     General Appearance: Well developed, well nourished, no acute distress. Ears/Nose/Mouth/Throat:   Normal MM; anicteric. JVP: WNL   Resp:   clear to auscultation bilaterally. Nl resp effort. Cardiovascular:  RRR, S1, S2 normal, no new murmur. No gallop or rub. Abdomen:   Soft, non-tender, bowel sounds are present. Extremities: 1+ edema bilaterally. Skin:  Neuro: Warm and dry. A; pleasantly demented, grossly nonfocal   cath site intact w/o hematoma or new bruit; distal pulse unchanged  Yes   Data Review:     Telemetry independently reviewed x sinus  chronic afib  parox afib  NSVT     ECG independently reviewed  NSR  afib  no significant changes  NSST-Tw chgs   x no new ECG provided for review   Lab results reviewed as noted below. Current medications reviewed as noted below. No results for input(s): PH, PCO2, PO2 in the last 72 hours.   Recent Labs      17   1425   TROIQ  0.04     Recent Labs      17   0308 11/23/17   1425   NA  141  144   K  3.6  3.9   CL  108  110*   CO2  26  26   BUN  16  17   CREA  1.01  0.99   GLU  71  89   PHOS  3.2   --    CA  8.6  8.7   ALB   --   3.4*   WBC  7.3  7.3   HGB  11.5  11.3*   HCT  35.4  34.9*   PLT  176  178     Lab Results   Component Value Date/Time    Cholesterol, total 185 11/19/2014 05:04 AM    HDL Cholesterol 71 11/19/2014 05:04 AM    LDL, calculated 102.4 11/19/2014 05:04 AM    Triglyceride 58 11/19/2014 05:04 AM    CHOL/HDL Ratio 2.6 11/19/2014 05:04 AM     Recent Labs      11/23/17   1425   SGOT  36   AP  61   TP  7.0   ALB  3.4*   GLOB  3.6     No results for input(s): INR, PTP, APTT in the last 72 hours.     No lab exists for component: INREXT   No components found for: [place]    Current Facility-Administered Medications   Medication Dose Route Frequency    memantine (NAMENDA) tablet 10 mg  10 mg Oral BID    furosemide (LASIX) injection 40 mg  40 mg IntraVENous Q12H    sodium chloride (NS) flush 5-10 mL  5-10 mL IntraVENous Q8H    sodium chloride (NS) flush 5-10 mL  5-10 mL IntraVENous PRN    acetaminophen (TYLENOL) tablet 650 mg  650 mg Oral Q4H PRN    ondansetron (ZOFRAN) injection 4 mg  4 mg IntraVENous Q4H PRN    enoxaparin (LOVENOX) injection 40 mg  40 mg SubCUTAneous Q24H        Tae Palmer MD

## 2017-11-24 NOTE — ROUTINE PROCESS
Primary Nurse Tania Frausto RN and DA Hastings performed a dual skin assessment on this patient No impairment noted  Sincere score is 16

## 2017-11-24 NOTE — PROGRESS NOTES
Pt is an 81 y/o [facility] American female who was admitted with a diagnosis of acute CHF. CM met with pt and pt's daughter re: initial assessment. CM introduced self, role. Pt and daughter verbalized understanding. During assessment, pt drifted in and out of sleep, but pt's daughter participated in assessment fully. Daughter and pt verified demographic information on chart. Pt's PCP is Dr. Becki Villalobos who she last saw 11/20/2017. Pt lives with her  in a 2 story home. Per daughter, pt requires assistance with ADL/IADL needs. She has access to a rollator, raised toilet seat, grab bars and a shower chair. Pt has not utilized SNF services prior to admission. Pt has utilized [facility] for home health needs. If it is recommended for home health, pt would like to use [facility] [address]. Pt daughter to assist with transportation at discharge and as needed. Pt daughter stated she has no further questions or needs at this time. CM to continue to assist with discharge planning as needed. Care Management Interventions  PCP Verified by CM: Yes  Last Visit to PCP: 11/20/17  Palliative Care Criteria Met (RRAT>21 & CHF Dx)?: No  Mode of Transport at Discharge: Other (see comment) (Pt daughter available for transportation at discharge)  Transition of Care Consult (CM Consult): Discharge Planning (Pt has utilized [facility] for home health, no SNF.   Pt requires assistance for ADL/IADL needs)  Discharge Durable Medical Equipment: No (Pt has access to a rollater, raised toilet seat, grab bars, shower chair,)  Physical Therapy Consult: Yes  Occupational Therapy Consult: Yes  Speech Therapy Consult: No  Current Support Network: Lives with Spouse, Own Home (Pt lives in a 2 story home with her )  Confirm Follow Up Transport: Family (Pt does not drive, but has supportive familyto assist with transportation as needed)  Plan discussed with Pt/Family/Caregiver: Yes  Discharge Location  Discharge Placement:  (TBD)    Kaye Ochoa MSW  [facility]  [phone]

## 2017-11-24 NOTE — PROGRESS NOTES
1900 Received report from DA Mata. Assumed patient care. Bedside shift change report given to Humera Nesbitt RN (oncoming nurse) by Francine Blizzard, RN (offgoing nurse). Report included the following information SBAR, Kardex, Intake/Output, MAR, Recent Results and Cardiac Rhythm NSR.

## 2017-11-25 LAB
ANION GAP SERPL CALC-SCNC: 4 MMOL/L (ref 5–15)
BUN SERPL-MCNC: 22 MG/DL (ref 6–20)
BUN/CREAT SERPL: 19 (ref 12–20)
CALCIUM SERPL-MCNC: 8.7 MG/DL (ref 8.5–10.1)
CHLORIDE SERPL-SCNC: 106 MMOL/L (ref 97–108)
CO2 SERPL-SCNC: 33 MMOL/L (ref 21–32)
CREAT SERPL-MCNC: 1.14 MG/DL (ref 0.55–1.02)
GLUCOSE SERPL-MCNC: 81 MG/DL (ref 65–100)
MAGNESIUM SERPL-MCNC: 2 MG/DL (ref 1.6–2.4)
PHOSPHATE SERPL-MCNC: 3.7 MG/DL (ref 2.6–4.7)
POTASSIUM SERPL-SCNC: 3.5 MMOL/L (ref 3.5–5.1)
SODIUM SERPL-SCNC: 143 MMOL/L (ref 136–145)

## 2017-11-25 PROCEDURE — 74011250637 HC RX REV CODE- 250/637: Performed by: INTERNAL MEDICINE

## 2017-11-25 PROCEDURE — 51798 US URINE CAPACITY MEASURE: CPT

## 2017-11-25 PROCEDURE — 74011250636 HC RX REV CODE- 250/636: Performed by: GENERAL ACUTE CARE HOSPITAL

## 2017-11-25 PROCEDURE — 36415 COLL VENOUS BLD VENIPUNCTURE: CPT | Performed by: GENERAL ACUTE CARE HOSPITAL

## 2017-11-25 PROCEDURE — 74011250636 HC RX REV CODE- 250/636: Performed by: INTERNAL MEDICINE

## 2017-11-25 PROCEDURE — 74011250637 HC RX REV CODE- 250/637: Performed by: GENERAL ACUTE CARE HOSPITAL

## 2017-11-25 PROCEDURE — 83735 ASSAY OF MAGNESIUM: CPT | Performed by: GENERAL ACUTE CARE HOSPITAL

## 2017-11-25 PROCEDURE — 84100 ASSAY OF PHOSPHORUS: CPT | Performed by: GENERAL ACUTE CARE HOSPITAL

## 2017-11-25 PROCEDURE — 65660000000 HC RM CCU STEPDOWN

## 2017-11-25 PROCEDURE — 80048 BASIC METABOLIC PNL TOTAL CA: CPT | Performed by: GENERAL ACUTE CARE HOSPITAL

## 2017-11-25 RX ORDER — FUROSEMIDE 40 MG/1
40 TABLET ORAL DAILY
Status: DISCONTINUED | OUTPATIENT
Start: 2017-11-26 | End: 2017-11-27 | Stop reason: HOSPADM

## 2017-11-25 RX ORDER — FUROSEMIDE 10 MG/ML
20 INJECTION INTRAMUSCULAR; INTRAVENOUS DAILY
Status: DISCONTINUED | OUTPATIENT
Start: 2017-11-25 | End: 2017-11-25

## 2017-11-25 RX ORDER — DOCUSATE SODIUM 100 MG/1
100 CAPSULE, LIQUID FILLED ORAL 2 TIMES DAILY
Status: DISCONTINUED | OUTPATIENT
Start: 2017-11-25 | End: 2017-11-27 | Stop reason: HOSPADM

## 2017-11-25 RX ORDER — GUAIFENESIN 100 MG/5ML
81 LIQUID (ML) ORAL DAILY
Status: DISCONTINUED | OUTPATIENT
Start: 2017-11-26 | End: 2017-11-26 | Stop reason: SDUPTHER

## 2017-11-25 RX ORDER — SPIRONOLACTONE 25 MG/1
25 TABLET ORAL DAILY
Status: DISCONTINUED | OUTPATIENT
Start: 2017-11-25 | End: 2017-11-27

## 2017-11-25 RX ORDER — GUAIFENESIN 100 MG/5ML
81 LIQUID (ML) ORAL DAILY
Status: DISCONTINUED | OUTPATIENT
Start: 2017-11-25 | End: 2017-11-27 | Stop reason: HOSPADM

## 2017-11-25 RX ADMIN — ACETAMINOPHEN 650 MG: 325 TABLET ORAL at 02:06

## 2017-11-25 RX ADMIN — DOCUSATE SODIUM 100 MG: 100 CAPSULE, LIQUID FILLED ORAL at 11:42

## 2017-11-25 RX ADMIN — LATANOPROST 1 DROP: 50 SOLUTION OPHTHALMIC at 18:00

## 2017-11-25 RX ADMIN — DOCUSATE SODIUM 100 MG: 100 CAPSULE, LIQUID FILLED ORAL at 17:20

## 2017-11-25 RX ADMIN — Medication 10 ML: at 22:00

## 2017-11-25 RX ADMIN — Medication 10 ML: at 06:02

## 2017-11-25 RX ADMIN — BRIMONIDINE TARTRATE 1 DROP: 2 SOLUTION/ DROPS OPHTHALMIC at 21:59

## 2017-11-25 RX ADMIN — SPIRONOLACTONE 25 MG: 25 TABLET, FILM COATED ORAL at 11:42

## 2017-11-25 RX ADMIN — ACETAMINOPHEN 650 MG: 325 TABLET ORAL at 09:41

## 2017-11-25 RX ADMIN — ENOXAPARIN SODIUM 40 MG: 40 INJECTION SUBCUTANEOUS at 21:58

## 2017-11-25 RX ADMIN — Medication 10 ML: at 17:20

## 2017-11-25 RX ADMIN — TIMOLOL MALEATE 1 DROP: 5 SOLUTION/ DROPS OPHTHALMIC at 21:59

## 2017-11-25 RX ADMIN — MEMANTINE 10 MG: 10 TABLET ORAL at 09:41

## 2017-11-25 RX ADMIN — BRIMONIDINE TARTRATE 1 DROP: 2 SOLUTION/ DROPS OPHTHALMIC at 09:42

## 2017-11-25 RX ADMIN — FUROSEMIDE 20 MG: 10 INJECTION, SOLUTION INTRAMUSCULAR; INTRAVENOUS at 09:41

## 2017-11-25 RX ADMIN — TIMOLOL MALEATE 1 DROP: 5 SOLUTION/ DROPS OPHTHALMIC at 09:42

## 2017-11-25 RX ADMIN — ASPIRIN 81 MG 81 MG: 81 TABLET ORAL at 11:42

## 2017-11-25 RX ADMIN — MEMANTINE 10 MG: 10 TABLET ORAL at 17:20

## 2017-11-25 NOTE — PROGRESS NOTES
Bedside shift change report given to Kourtney Sigala (oncoming nurse) by Olena Amos RN (offgoing nurse). Report included the following information SBAR.

## 2017-11-25 NOTE — CARDIO/PULMONARY
CP REHAB NOTE    Chart Review: Patient admitted for frequent falls. Medical History: HTN, dementia, TIA, OA  EF 55%, Echo 11/24/2017  Former Smoker    Met with daughter as patient was sleeping and has dementia. Cardiac Rehab: Heart Failure education folder, with Low Sodium brochure, given to [employer]. Educated using teach back method. Assessed family understanding of heart condition. Encouraged them to speak to cardiologist about diagnosis. Reviewed importance of daily weight monitoring and Low Sodium diet (3423-0706 mg. daily). Encouraged activity and rest periods within symptom limitations and as ordered by physician. Reviewed lasix, purpose of medication, potential side effects, compliance, and what to do if dose is missed. Discussed importance of reporting signs and symptoms of exacerbation, and when to report them to the doctor, to prevent re-hospitalization. [employer] was encouraged to keep all appointments with doctor. Patient has not been performing a daily weight so we reviewed the importance of daily weights, rationale, and parameters for when to call the doctor. Encouraged low sodium diet and a reduction in intake of fast foods. Per daughter patient usually eats [employer] for breakfast and fast food for lunch. Her breakfast is usually pancakes and one sausage so fairly good choice for [employer]. Daughter reports that her mother does not eat much. Suggested walking several times throughout the day for heart health. Patient needs to use her walker more at home. Kirstie Persaud RN    [employer] could benefit from further education on the following HF topics: daily weights and low sodium diet.

## 2017-11-25 NOTE — PROGRESS NOTES
Bedside shift change report given to [id number] (oncoming nurse) by Mikayla Galicia (offgoing nurse). Report included the following information SBAR, Kardex, Intake/Output, MAR and Recent Results. SHIFT SUMMARY:  Patient c/o lower abdominal pain; bowel sounds hypoactive, tender, and distended. Nurse paged Dr. Marley Han with new orders received for bowel regimen. Nurse administered medication as ordered. [facility] NURSING NOTE   Admission Date 11/23/2017   Admission Diagnosis Acute CHF (congestive heart failure) ([facility] [address]   Consults IP CONSULT TO CARDIOLOGY      Cardiac Monitoring [x] Yes [] No      Purposeful Hourly Rounding [x] Yes    Fauzia Score Total Score: 5   Fauiza score 3 or > [x] Bed Alarm [] Avasys [x] 1:1 sitter [] Patient refused (Place signed refusal form in chart)   Sincere Score Sincere Score: 16   Sincere score 14 or < [] PMT consult [] Wound Care consult    []  Specialty bed  [] Nutrition consult      Influenza Vaccine Received Flu Vaccine for Current Season (usually Sept-March): No    Patient/Guardian Refused (Notify MD): Yes      Oxygen needs? [x] Room air Oxygen @  []1L    []2L    []3L   []4L    []5L   []6L     Use home O2? [] Yes [x] No  Perform O2 challenge test using  smartphrase (.Homeoxygen)      Last bowel movement        Urinary Catheter       External Female Catheter 11/23/17-Urine Output (mL): 400 ml     LDAs               Peripheral IV 11/23/17 Right Antecubital (Active)   Site Assessment Clean, dry, & intact 11/25/2017  8:17 AM   Phlebitis Assessment 0 11/25/2017  8:17 AM   Infiltration Assessment 0 11/25/2017  8:17 AM   Dressing Status Clean, dry, & intact 11/25/2017  8:17 AM   Dressing Type Tape;Transparent 11/25/2017  8:17 AM   Hub Color/Line Status Pink;Patent; Flushed 11/25/2017  8:17 AM   Action Taken Blood drawn 11/23/2017  5:38 PM   Alcohol Cap Used No 11/25/2017  8:17 AM          External Female Catheter 11/23/17 (Active)   Site Assessment Intact 11/25/2017  8:17 AM   Repositioned Yes 11/25/2017  8:17 AM   Perineal Care Yes 11/25/2017  8:17 AM   Aidan Noss Changed Yes 11/25/2017  8:17 AM   Suction Canister/Tubing Changed No 11/25/2017  3:55 AM   Urine Output (mL) 400 ml 11/25/2017  8:17 AM                   Readmission Risk Assessment Tool Score Medium Risk            15       Total Score        3 Has Seen PCP in Last 6 Months (Yes=3, No=0)    2 . Living with Significant Other. Assisted Living. LTAC. SNF. or   Rehab    5 Pt.  Coverage (Medicare=5 , Medicaid, or Self-Pay=4)    5 Charlson Comorbidity Score (Age + Comorbid Conditions)        Criteria that do not apply:    Patient Length of Stay (>5 days = 3)    IP Visits Last 12 Months (1-3=4, 4=9, >4=11)       Expected Length of Stay 2d 12h   Actual Length of Stay 2

## 2017-11-25 NOTE — PROGRESS NOTES
Hospitalist Progress Note    NAME: Speedy Kincaid   :  1931   MRN:  [id number]       Assessment / Plan:  Acute diastolic HF, EF 10%  Bradycardia  -Leg swelling; elevated proBNP 12K; CXR central congestion  -Continue Telemetry monitoring - no clear indication for pacemaker  -Echo noted  -Appreciate Cardiology follow up: Lasix decreased to 20mg PO and Aldactone to 25mg, beta blocker held due to bradycardia  -Daily weight - admission weight 170, today 150 lbs  -Monitor UOP, 24 hour -1200mL   -Continue to monitor renal function, Cr trending up slowly, currently 1.14  -Pt likely DC to SNF    Frequent falls  Osteoarthritis  -xray R knee Tricompartmental osteoarthritis. Moderate joint effusion  -PT OT eval and treat.  May benefit from SNF rehab     HypoMag, resolved  Dementia  -mood stable.  Continue namenda  TIA  -continue ASA  Glaucoma     Body mass index is 24.94 kg/(m^2).     Code status: Full  Prophylaxis: Lovenox  Recommended Disposition: TBD     Subjective:     Chief Complaint / Reason for Physician Visit  \"Oh I feel fine\". Discussed with RN events overnight. Review of Systems:  Symptom Y/N Comments  Symptom Y/N Comments   Fever/Chills n   Chest Pain n    Poor Appetite n   Edema     Cough    Abdominal Pain n    Sputum    Joint Pain     SOB/CARRERO n   Pruritis/Rash     Nausea/vomit n   Tolerating PT/OT y    Diarrhea n   Tolerating Diet y    Constipation n   Other       Could NOT obtain due to:      Objective:     VITALS:   Last 24hrs VS reviewed since prior progress note.  Most recent are:  Patient Vitals for the past 24 hrs:   Temp Pulse Resp BP SpO2   17 0817 98.2 °F (36.8 °C) 62 18 138/73 97 %   17 0355 98.4 °F (36.9 °C) 66 18 161/82 97 %   17 2331 97.9 °F (36.6 °C) 64 18 132/70 100 %   17 2028 98.9 °F (37.2 °C) 74 18 150/78 95 %   17 1703 - - - 132/72 -   17 1650 - - - 121/82 -   17 1645 - - - (!) 145/95 -   17 1637 - (!) 55 - 116/64 97 % 11/24/17 1601 - - - 111/68 -   11/24/17 1340 - - - 117/79 -   11/24/17 1335 - - - 121/70 -   11/24/17 1333 - - - 115/59 -   11/24/17 1131 97.7 °F (36.5 °C) 71 18 129/68 100 %       Intake/Output Summary (Last 24 hours) at 11/25/17 1113  Last data filed at 11/24/17 1219   Gross per 24 hour   Intake                0 ml   Output             1200 ml   Net            -1200 ml        PHYSICAL EXAM:  General:                    Awake, alert, cooperative    EENT:                       EOMI. Anicteric sclerae. MMM  Resp:                        Ctab  CV:                            Regular  rhythm,  +LE edema improved  GI:                             Soft, Non distended, Non tender.  +Bowel sounds  Neurologic:                Alert and oriented X 1, normal speech, no focal deficits   Psych:                       Poor insight. Not anxious nor agitated  Skin:                          No rashes. No jaundice  Reviewed most current lab test results and cultures  YES  Reviewed most current radiology test results   YES  Review and summation of old records today    NO  Reviewed patient's current orders and MAR    YES  PMH/ reviewed - no change compared to H&P  ________________________________________________________________________  Care Plan discussed with:    Comments   Patient y    Family  y    RN y    Care Manager     Consultant                        Multidiciplinary team rounds were held today with , nursing, pharmacist and clinical coordinator. Patient's plan of care was discussed; medications were reviewed and discharge planning was addressed.      ________________________________________________________________________  Total NON critical care TIME:  30   Minutes    Total CRITICAL CARE TIME Spent:   Minutes non procedure based      Comments   >50% of visit spent in counseling and coordination of care     ________________________________________________________________________  Antonia Jointer, MD     Procedures: see electronic medical records for all procedures/Xrays and details which were not copied into this note but were reviewed prior to creation of Plan. LABS:  I reviewed today's most current labs and imaging studies.   Pertinent labs include:  Recent Labs      11/24/17 0308 11/23/17   1425   WBC  7.3  7.3   HGB  11.5  11.3*   HCT  35.4  34.9*   PLT  176  178     Recent Labs      11/25/17   0212 11/24/17 0308 11/23/17   1425   NA  143  141  144   K  3.5  3.6  3.9   CL  106  108  110*   CO2  33*  26  26   GLU  81  71  89   BUN  22*  16  17   CREA  1.14*  1.01  0.99   CA  8.7  8.6  8.7   MG  2.0  1.4*   --    PHOS  3.7  3.2   --    ALB   --    --   3.4*   TBILI   --    --   0.3   SGOT   --    --   36   ALT   --    --   33       Signed: Joan White MD

## 2017-11-25 NOTE — PROGRESS NOTES
Cardiology Progress Note  11/25/2017     Admit Date: 11/23/2017  Admit Diagnosis: Acute CHF (congestive heart failure) ([facility])  CC: none currently  Cardiac Assessment/Plan:   Recurrent falls: doesn't sound cardiac per se but needs further tele monitoring (will need outpt event monitor if no events here) and monitoring of orthostatic BPs. Echo EF 55%. Diastolic CHF. No beta blocker due to low HR. Resume ASA. Change IV lasix to po 40mg daily, outpt dose was 20mg. Decrease aldactone from 50mg to 25mg. PT possible SNF. Tele so far unrevealing. HTN: stable so far (needs orthostatic monitoring). Rhythm: sinus/sinus dawood with PVCs: no current indication for PPM (her daughter is willing to consider PPM if needed to prevent falls). Renal function: stable    Dementia, Falls, Dispo: all per primary team.   For other plans, see orders. As per H/P: \"86 y.o.  female with a history of HTN, dementia, TIA and OA who presents via EMS to the ER because of frequent falls. Daughter reports that the patient has fallen 4 times in the last 1.5 weeks. Patient is not clear on why falls. Sounds like her legs just gave out as she denies dizziness, vertigo or LOC. She endorses leg swelling x 2 weeks but denies SOB, CP or orthopnea. Her PCP just recently increased her lasix dose. EMS found patient bradycardic in the 40-50s. ER evaluation demonstrates elevated proBNP 12K and cxr with central congestion\"    Also, apparently dilt was d/cd by PCP a days PTA.  ________________________________________________________________________  @ last OV 8/11/16:       Ms Madison Monahan has a h/o HTN, intermittent vertigo/ataxia (neg MRI 2014 except extensive WMD) and significant dementia; Her daughter Mally Becerra, pt of mine) cares for her and her  at night. The pt cannot participate in medical decision making; her daughter would like a non-invasive approach to care.     5/2016: Intermittent vertigo noted; No angina/CARRERO. Her HR was reportedly palpated at 38 on 4/27 PCP visit: ecg from then shows HR in 70s with PVC. No current complaints: mild carotid disease; echo unremarkable. 8/2016: No Sx; Dilt and aldactone decreased by PCP; dose unknown (?CKD). Did not supply current meds list, will call with updated meds. IMPRESSION AND PLAN  01. Bradycardia:  Neg holter 5/2016: no Rx needed at this point. ECG done today   02. Occlusion and stenosis of bilateral carotid arteries:  Mild carotid disease; repeat next year. Carotid Duplex to be done as specified in the orders. 03. Hypertensive heart disease without heart failure:  Adequately controlled. 04. 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   05. Long term (current) use of aspirin:  This condition is stable. 06. Personal history of nicotine dependence:  remains abstinent from tobacco use. 07. Body mass index (BMI) 28.0-28.9, adult         ORDERS:  1 ECG done today   2 Carotid Duplex in 1 Year. 3 Giving encouragement to exercise   4 Dietary management education, guidance, and counseling   5 Return office visit with Jojo Dailey MD in 1 Year.      8/11/16 MEDICATION LIST    Medication Sig Description   aspirin 325 mg tablet take 1 tablet by oral route  every day   Cartia  mg capsule,extended release changed by PCP   Glucosamine 500 mg tablet as directed   iron  mg (65 mg iron) capsule,extended release take 1 by Oral route  every day   Klor-Con 8 mEq tablet,extended release take 1 tablet by oral route  every day with food   Namenda XR 14 mg capsule sprinkle,extended release take 1 capsule by oral route  every day   spironolactone 50 mg tablet changed by PCP   Tylenol 325 mg tablet take 2 tablet by oral route  every 4 hours as needed   vitamin B complex tablet 1 po qd   Vitamin C 500 mg tablet one po qd __________________________________________________________________________________________  CARDIAC HISTORY  ARRHYTHMIA:  1 Bradycardia [Neg holter.] - 2016     RISK FACTORS:  1 Hypertension       CARDIOVASCULAR PROCEDURES  ECHO/MUGA:  Echo (EF 0.55 (55%), Mild LVH, Mild TR, Mild AR, Mild MR, LA Diam 4.4 cm, Est RVSP 41 mmHg, Normal RV.) - 2016   ELECTROPHYSIOLOGY:  EKG (Occasional PVCs, Sinus Rhythm, LAFB, poor r wave progression. No change.) - 2016   Holter (Sinus Rhythm, No Malignant Arrhythmias, (55-91, ave 71); no significant bradycardia); Incidental ASx 6 beats PAT. ) - 2016   VASCULAR:  Carotid Duplex (1 - 49% Bilateral ICAs, Vertebral: Bilateral Antegrade Flow) - 2016       ALLERGIES/INTOLERANCES:    Ingredient Reaction Medication Name Comment   NO KNOWN ALLERGIES        None    PROBLEM LIST:    Problem Description Chronic   Benign essential HTN Y   Bradycardia Y         PAST MEDICAL/SURGICAL HISTORY  (Detailed)    Disease/disorder Onset Date Management Date Comments     Appendectomy     cyst on arm       dementia       Hypertension             Family History  (Detailed)    Relationship Family Member Name  Age at Death Condition Onset Age Cause of Death   Father  Y  heart issues  Y   Mother  Y  Asthma  N   SOCIAL HISTORY  (Detailed)  Tobacco use reviewed. Preferred language is [place]. EDUCATION/EMPLOYMENT/OCCUPATION    Employment History Status Retired Restrictions     retired       retired       retired     Smoking status: Former smoker. SMOKING STATUS  Use Status Type Smoking Status Usage Per Day Years Used Total Pack Years   yes  Former smoker            Hospital problem list   C/Sai Thakkar 1106 Problems    Diagnosis Date Noted    Acute CHF (congestive heart failure) ([facility] [address] 2017        Subjective:  Herminiajustin Wallace reports   Chest pain X none  consistent with:  Non-cardiac CP         Atypical CP     None now  On going  Anginal CP     Dyspnea X none  at rest with exertion         improved  unchanged  worse              PND X none  overnight       Orthopnea X none  improved  unchanged  worse   Presyncope X none  improved  unchanged  worse     Ambulated in hallway without symptoms   Yes   Ambulated in room without symptoms  Yes   Objective:    Physical Exam:  Overall VSSAF;    Visit Vitals    /73 (BP 1 Location: Right arm, BP Patient Position: At rest;Supine)    Pulse 62    Temp 98.2 °F (36.8 °C)    Resp 18    Ht 5' 5\" (1.651 m)    Wt 68 kg (150 lb)    SpO2 97%    BMI 24.96 kg/m2     Temp (24hrs), Av.2 °F (36.8 °C), Min:97.7 °F (36.5 °C), Max:98.9 °F (37.2 °C)    Patient Vitals for the past 8 hrs:   Pulse   17 0817 62   17 0355 66    Patient Vitals for the past 8 hrs:   Resp   17 0817 18   17 0355 18    Patient Vitals for the past 8 hrs:   BP   17 0817 138/73   17 0355 161/82          Intake/Output Summary (Last 24 hours) at 17 0950  Last data filed at 17 1219   Gross per 24 hour   Intake                0 ml   Output             1200 ml   Net            -1200 ml     General Appearance: Well developed, well nourished, no acute distress. Ears/Nose/Mouth/Throat:   Normal MM; anicteric. JVP: WNL   Resp:   clear to auscultation bilaterally. Nl resp effort. Cardiovascular:  RRR, S1, S2 normal, no new murmur. No gallop or rub. Abdomen:   Soft, non-tender, bowel sounds are present. Extremities: 1+ edema bilaterally. Skin:  Neuro: Warm and dry. A; pleasantly demented, grossly nonfocal   cath site intact w/o hematoma or new bruit; distal pulse unchanged  Yes   Data Review:     Telemetry independently reviewed x sinus  chronic afib  parox afib  NSVT     ECG independently reviewed  NSR  afib  no significant changes  NSST-Tw chgs   x no new ECG provided for review   Lab results reviewed as noted below. Current medications reviewed as noted below.     No results for input(s): PH, PCO2, PO2 in the last 72 hours. Recent Labs      11/23/17   1425   TROIQ  0.04     Recent Labs      11/25/17   0212  11/24/17   0308  11/23/17   1425   NA  143  141  144   K  3.5  3.6  3.9   CL  106  108  110*   CO2  33*  26  26   BUN  22*  16  17   CREA  1.14*  1.01  0.99   GLU  81  71  89   PHOS  3.7  3.2   --    CA  8.7  8.6  8.7   ALB   --    --   3.4*   WBC   --   7.3  7.3   HGB   --   11.5  11.3*   HCT   --   35.4  34.9*   PLT   --   176  178     Lab Results   Component Value Date/Time    Cholesterol, total 185 11/19/2014 05:04 AM    HDL Cholesterol 71 11/19/2014 05:04 AM    LDL, calculated 102.4 11/19/2014 05:04 AM    Triglyceride 58 11/19/2014 05:04 AM    CHOL/HDL Ratio 2.6 11/19/2014 05:04 AM     Recent Labs      11/23/17   1425   SGOT  36   AP  61   TP  7.0   ALB  3.4*   GLOB  3.6     No results for input(s): INR, PTP, APTT in the last 72 hours.     No lab exists for component: INREXT, INREXT   No components found for: GLPOC    Current Facility-Administered Medications   Medication Dose Route Frequency    [START ON 11/26/2017] furosemide (LASIX) tablet 40 mg  40 mg Oral DAILY    aspirin chewable tablet 81 mg  81 mg Oral DAILY    spironolactone (ALDACTONE) tablet 25 mg  25 mg Oral DAILY    latanoprost (XALATAN) 0.005 % ophthalmic solution 1 Drop  1 Drop Both Eyes QPM    brimonidine (ALPHAGAN) 0.2 % ophthalmic solution 1 Drop  1 Drop Both Eyes Q12H    timolol (TIMOPTIC) 0.5 % ophthalmic solution 1 Drop  1 Drop Both Eyes Q12H    memantine (NAMENDA) tablet 10 mg  10 mg Oral BID    sodium chloride (NS) flush 5-10 mL  5-10 mL IntraVENous Q8H    sodium chloride (NS) flush 5-10 mL  5-10 mL IntraVENous PRN    acetaminophen (TYLENOL) tablet 650 mg  650 mg Oral Q4H PRN    ondansetron (ZOFRAN) injection 4 mg  4 mg IntraVENous Q4H PRN    enoxaparin (LOVENOX) injection 40 mg  40 mg SubCUTAneous Q24H        Humphrey Cardona MD

## 2017-11-26 LAB
ANION GAP SERPL CALC-SCNC: 4 MMOL/L (ref 5–15)
BUN SERPL-MCNC: 21 MG/DL (ref 6–20)
BUN/CREAT SERPL: 18 (ref 12–20)
CALCIUM SERPL-MCNC: 8.3 MG/DL (ref 8.5–10.1)
CHLORIDE SERPL-SCNC: 106 MMOL/L (ref 97–108)
CO2 SERPL-SCNC: 33 MMOL/L (ref 21–32)
CREAT SERPL-MCNC: 1.2 MG/DL (ref 0.55–1.02)
GLUCOSE SERPL-MCNC: 80 MG/DL (ref 65–100)
POTASSIUM SERPL-SCNC: 4.2 MMOL/L (ref 3.5–5.1)
SODIUM SERPL-SCNC: 143 MMOL/L (ref 136–145)

## 2017-11-26 PROCEDURE — 74011250637 HC RX REV CODE- 250/637: Performed by: GENERAL ACUTE CARE HOSPITAL

## 2017-11-26 PROCEDURE — 36415 COLL VENOUS BLD VENIPUNCTURE: CPT | Performed by: INTERNAL MEDICINE

## 2017-11-26 PROCEDURE — 74011250637 HC RX REV CODE- 250/637: Performed by: INTERNAL MEDICINE

## 2017-11-26 PROCEDURE — 74011250636 HC RX REV CODE- 250/636: Performed by: INTERNAL MEDICINE

## 2017-11-26 PROCEDURE — 65660000000 HC RM CCU STEPDOWN

## 2017-11-26 PROCEDURE — 80048 BASIC METABOLIC PNL TOTAL CA: CPT | Performed by: INTERNAL MEDICINE

## 2017-11-26 RX ADMIN — BRIMONIDINE TARTRATE 1 DROP: 2 SOLUTION/ DROPS OPHTHALMIC at 21:28

## 2017-11-26 RX ADMIN — DOCUSATE SODIUM 100 MG: 100 CAPSULE, LIQUID FILLED ORAL at 09:56

## 2017-11-26 RX ADMIN — TIMOLOL MALEATE 1 DROP: 5 SOLUTION/ DROPS OPHTHALMIC at 21:28

## 2017-11-26 RX ADMIN — TIMOLOL MALEATE 1 DROP: 5 SOLUTION/ DROPS OPHTHALMIC at 09:59

## 2017-11-26 RX ADMIN — Medication 10 ML: at 05:22

## 2017-11-26 RX ADMIN — ENOXAPARIN SODIUM 40 MG: 40 INJECTION SUBCUTANEOUS at 21:28

## 2017-11-26 RX ADMIN — MEMANTINE 10 MG: 10 TABLET ORAL at 17:33

## 2017-11-26 RX ADMIN — SPIRONOLACTONE 25 MG: 25 TABLET, FILM COATED ORAL at 09:56

## 2017-11-26 RX ADMIN — DOCUSATE SODIUM 100 MG: 100 CAPSULE, LIQUID FILLED ORAL at 17:34

## 2017-11-26 RX ADMIN — MEMANTINE 10 MG: 10 TABLET ORAL at 09:56

## 2017-11-26 RX ADMIN — FUROSEMIDE 40 MG: 40 TABLET ORAL at 09:56

## 2017-11-26 RX ADMIN — ASPIRIN 81 MG 81 MG: 81 TABLET ORAL at 09:56

## 2017-11-26 RX ADMIN — LATANOPROST 1 DROP: 50 SOLUTION OPHTHALMIC at 18:00

## 2017-11-26 RX ADMIN — BRIMONIDINE TARTRATE 1 DROP: 2 SOLUTION/ DROPS OPHTHALMIC at 09:58

## 2017-11-26 RX ADMIN — Medication 10 ML: at 21:28

## 2017-11-26 NOTE — PROGRESS NOTES
Intermittent vertigo noted; No angina/CARRERO. Her HR was reportedly palpated at 38 on 4/27 PCP visit: ecg from then shows HR in 70s with PVC. No current complaints: mild carotid disease; echo unremarkable. 8/2016: No Sx; Dilt and aldactone decreased by PCP; dose unknown (?CKD). Did not supply current meds list, will call with updated meds. IMPRESSION AND PLAN  01. Bradycardia:  Neg holter 5/2016: no Rx needed at this point. ECG done today   02. Occlusion and stenosis of bilateral carotid arteries:  Mild carotid disease; repeat next year. Carotid Duplex to be done as specified in the orders. 03. Hypertensive heart disease without heart failure:  Adequately controlled. 04. 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   05. Long term (current) use of aspirin:  This condition is stable. 06. Personal history of nicotine dependence:  remains abstinent from tobacco use. 07. Body mass index (BMI) 28.0-28.9, adult         ORDERS:  1 ECG done today   2 Carotid Duplex in 1 Year. 3 Giving encouragement to exercise   4 Dietary management education, guidance, and counseling   5 Return office visit with Jaime Dailey MD in 1 Year.      8/11/16 MEDICATION LIST    Medication Sig Description   aspirin 325 mg tablet take 1 tablet by oral route  every day   Cartia  mg capsule,extended release changed by PCP   Glucosamine 500 mg tablet as directed   iron  mg (65 mg iron) capsule,extended release take 1 by Oral route  every day   Klor-Con 8 mEq tablet,extended release take 1 tablet by oral route  every day with food   Namenda XR 14 mg capsule sprinkle,extended release take 1 capsule by oral route  every day   spironolactone 50 mg tablet changed by PCP   Tylenol 325 mg tablet take 2 tablet by oral route  every 4 hours as needed   vitamin B complex tablet 1 po qd   Vitamin C 500 mg tablet one po qd __________________________________________________________________________________________  CARDIAC HISTORY  ARRHYTHMIA:  1 Bradycardia [Neg holter.] - 2016     RISK FACTORS:  1 Hypertension       CARDIOVASCULAR PROCEDURES  ECHO/MUGA:  Echo (EF 0.55 (55%), Mild LVH, Mild TR, Mild AR, Mild MR, LA Diam 4.4 cm, Est RVSP 41 mmHg, Normal RV.) - 2016   ELECTROPHYSIOLOGY:  EKG (Occasional PVCs, Sinus Rhythm, LAFB, poor r wave progression. No change.) - 2016   Holter (Sinus Rhythm, No Malignant Arrhythmias, (55-91, ave 71); no significant bradycardia); Incidental ASx 6 beats PAT. ) - 2016   VASCULAR:  Carotid Duplex (1 - 49% Bilateral ICAs, Vertebral: Bilateral Antegrade Flow) - 2016       ALLERGIES/INTOLERANCES:    Ingredient Reaction Medication Name Comment   NO KNOWN ALLERGIES        None    PROBLEM LIST:    Problem Description Chronic   Benign essential HTN Y   Bradycardia Y         PAST MEDICAL/SURGICAL HISTORY  (Detailed)    Disease/disorder Onset Date Management Date Comments     Appendectomy     cyst on arm       dementia       Hypertension             Family History  (Detailed)    Relationship Family Member Name  Age at Death Condition Onset Age Cause of Death   Father  Y  heart issues  Y   Mother  Y  Asthma  N   SOCIAL HISTORY  (Detailed)  Tobacco use reviewed. Preferred language is [place]. EDUCATION/EMPLOYMENT/OCCUPATION    Employment History Status Retired Restrictions     retired       retired       retired     Smoking status: Former smoker. SMOKING STATUS  Use Status Type Smoking Status Usage Per Day Years Used Total Pack Years   yes  Former smoker            Hospital problem list   C/Sai Thak[facility] Problems    Diagnosis Date Noted    Acute CHF (congestive heart failure) ([address] 2017        Subjective:  Nayely New reports   Chest pain X none  consistent with:  Non-cardiac CP         Atypical CP     None now  On going  Anginal CP     Dyspnea X none  at rest with exertion         improved  unchanged  worse              PND X none  overnight       Orthopnea X none  improved  unchanged  worse   Presyncope X none  improved  unchanged  worse     Ambulated in hallway without symptoms   Yes   Ambulated in room without symptoms  Yes   Objective:    Physical Exam:  Overall VSSAF;    Visit Vitals    /79 (BP 1 Location: Right arm, BP Patient Position: At rest)    Pulse 87    Temp 98.4 °F (36.9 °C)    Resp 18    Ht 5' 5\" (1.651 m)    Wt 63.9 kg (140 lb 14 oz)    SpO2 96%    BMI 23.44 kg/m2     Temp (24hrs), Av.4 °F (36.9 °C), Min:98.1 °F (36.7 °C), Max:98.7 °F (37.1 °C)    Patient Vitals for the past 8 hrs:   Pulse   17 0748 87   17 0325 68    Patient Vitals for the past 8 hrs:   Resp   17 0748 18   17 0325 18    Patient Vitals for the past 8 hrs:   BP   17 0748 146/79   17 0325 134/67          Intake/Output Summary (Last 24 hours) at 17 1009  Last data filed at 17 1515   Gross per 24 hour   Intake              120 ml   Output              200 ml   Net              -80 ml     General Appearance: Well developed, well nourished, no acute distress. Ears/Nose/Mouth/Throat:   Normal MM; anicteric. JVP: WNL   Resp:   clear to auscultation bilaterally. Nl resp effort. Cardiovascular:  RRR, S1, S2 normal, no new murmur. No gallop or rub. Abdomen:   Soft, non-tender, bowel sounds are present. Extremities: 1+ edema bilaterally. Skin:  Neuro: Warm and dry. A; pleasantly demented, grossly nonfocal   cath site intact w/o hematoma or new bruit; distal pulse unchanged  Yes   Data Review:     Telemetry independently reviewed x sinus  chronic afib  parox afib  NSVT     ECG independently reviewed  NSR  afib  no significant changes  NSST-Tw chgs   x no new ECG provided for review   Lab results reviewed as noted below. Current medications reviewed as noted below.     No results for input(s): PH, PCO2, PO2 in the last 72 hours. Recent Labs      11/23/17   1425   TROIQ  0.04     Recent Labs      11/26/17   0313  11/25/17   0212  11/24/17   0308  11/23/17   1425   NA  143  143  141  144   K  4.2  3.5  3.6  3.9   CL  106  106  108  110*   CO2  33*  33*  26  26   BUN  21*  22*  16  17   CREA  1.20*  1.14*  1.01  0.99   GLU  80  81  71  89   PHOS   --   3.7  3.2   --    CA  8.3*  8.7  8.6  8.7   ALB   --    --    --   3.4*   WBC   --    --   7.3  7.3   HGB   --    --   11.5  11.3*   HCT   --    --   35.4  34.9*   PLT   --    --   176  178     Lab Results   Component Value Date/Time    Cholesterol, total 185 11/19/2014 05:04 AM    HDL Cholesterol 71 11/19/2014 05:04 AM    LDL, calculated 102.4 11/19/2014 05:04 AM    Triglyceride 58 11/19/2014 05:04 AM    CHOL/HDL Ratio 2.6 11/19/2014 05:04 AM     Recent Labs      11/23/17   1425   SGOT  36   AP  61   TP  7.0   ALB  3.4*   GLOB  3.6     No results for input(s): INR, PTP, APTT in the last 72 hours.     No lab exists for component: INREXT, INREXT   No components found for: GLPOC    Current Facility-Administered Medications   Medication Dose Route Frequency    furosemide (LASIX) tablet 40 mg  40 mg Oral DAILY    aspirin chewable tablet 81 mg  81 mg Oral DAILY    spironolactone (ALDACTONE) tablet 25 mg  25 mg Oral DAILY    docusate sodium (COLACE) capsule 100 mg  100 mg Oral BID    latanoprost (XALATAN) 0.005 % ophthalmic solution 1 Drop  1 Drop Both Eyes QPM    brimonidine (ALPHAGAN) 0.2 % ophthalmic solution 1 Drop  1 Drop Both Eyes Q12H    timolol (TIMOPTIC) 0.5 % ophthalmic solution 1 Drop  1 Drop Both Eyes Q12H    memantine (NAMENDA) tablet 10 mg  10 mg Oral BID    sodium chloride (NS) flush 5-10 mL  5-10 mL IntraVENous Q8H    sodium chloride (NS) flush 5-10 mL  5-10 mL IntraVENous PRN    acetaminophen (TYLENOL) tablet 650 mg  650 mg Oral Q4H PRN    ondansetron (ZOFRAN) injection 4 mg  4 mg IntraVENous Q4H PRN    enoxaparin (LOVENOX) injection 40 mg  40 mg SubCUTAneous Booker Rosas MD

## 2017-11-26 NOTE — PROGRESS NOTES
Bedside and Verbal shift change report given to [address] [place] [address] (oncoming nurse) by Lesley Miller (offgoing nurse). Report included the following information SBAR, Kardex, MAR, Accordion, Recent Results and Cardiac Rhythm nsr w/ pvcs.

## 2017-11-26 NOTE — PROGRESS NOTES
Hospitalist Progress Note    NAME: Benja Shea   :  1931   MRN:  [id number]       Assessment / Plan:  Acute diastolic HF, EF 46%, overload improved  Bradycardia  -Leg swelling; elevated proBNP 12K; CXR central congestion  -Continue Telemetry monitoring - no clear indication for pacemaker  -Echo noted  -Appreciate Cardiology follow up: Lasix decreased to 20mg PO and Aldactone to 25mg, beta blocker held due to bradycardia  -Daily weight - admission weight 170, today 150 lbs  -Monitor UOP, 24 hour -80mL  -Continue to monitor renal function, Cr trending up slowly, currently 1.20  -Will repeat tmr AM  -Pt likely DC to SNF early this week    Frequent falls  Osteoarthritis  -xray R knee Tricompartmental osteoarthritis. Moderate joint effusion  -PT OT eval and treat.  May benefit from SNF rehab      Dementia  -mood stable.  Continue namenda  TIA  -continue ASA    Glaucoma    Body mass index is 24.94 kg/(m^2).     Code status: Full  Prophylaxis: Lovenox  Recommended Disposition:SNF      Subjective:     Chief Complaint / Reason for Physician Visit  \"Oh I am doing fine\". Discussed with RN events overnight. Review of Systems:  Symptom Y/N Comments  Symptom Y/N Comments   Fever/Chills n   Chest Pain n    Poor Appetite n   Edema     Cough n   Abdominal Pain n    Sputum    Joint Pain     SOB/CARRERO n   Pruritis/Rash     Nausea/vomit n   Tolerating PT/OT y    Diarrhea n   Tolerating Diet y    Constipation n   Other       Could NOT obtain due to:      Objective:     VITALS:   Last 24hrs VS reviewed since prior progress note.  Most recent are:  Patient Vitals for the past 24 hrs:   Temp Pulse Resp BP SpO2   17 1100 98.5 °F (36.9 °C) (!) 58 18 116/68 100 %   17 0748 98.4 °F (36.9 °C) 87 18 146/79 96 %   17 0325 98.5 °F (36.9 °C) 68 18 134/67 99 %   17 2356 98.2 °F (36.8 °C) 70 18 143/90 100 %   17 1939 98.4 °F (36.9 °C) 77 18 146/58 99 %   17 1609 98.7 °F (37.1 °C) 65 18 111/43 100 %       Intake/Output Summary (Last 24 hours) at 11/26/17 1226  Last data filed at 11/25/17 1515   Gross per 24 hour   Intake              120 ml   Output              200 ml   Net              -80 ml        PHYSICAL EXAM:  General:                    Awake, alert, cooperative    EENT:                       EOMI. Anicteric sclerae. MMM  Resp:                        Ctab  KT:                            ZWHZAYL  rhythm,  +LE edema improved  GI:                             Soft, Non distended, Non tender.  +Bowel sounds  Neurologic:                Alert and oriented X 2, normal speech, no focal deficits   Psych:                       Poor insight. Not anxious nor agitated  Skin:                          No rashes.  No jaundice    Reviewed most current lab test results and cultures  YES  Reviewed most current radiology test results   YES  Review and summation of old records today    NO  Reviewed patient's current orders and MAR    YES  PMH/ reviewed - no change compared to H&P  ________________________________________________________________________  Care Plan discussed with:    Comments   Patient y    Family      RN     Care Manager     Consultant                        Multidiciplinary team rounds were held today with , nursing, pharmacist and clinical coordinator. Patient's plan of care was discussed; medications were reviewed and discharge planning was addressed. ________________________________________________________________________  Total NON critical care TIME:  25   Minutes    Total CRITICAL CARE TIME Spent:   Minutes non procedure based      Comments   >50% of visit spent in counseling and coordination of care     ________________________________________________________________________  Susy George MD     Procedures: see electronic medical records for all procedures/Xrays and details which were not copied into this note but were reviewed prior to creation of Plan.       LABS:  I reviewed today's most current labs and imaging studies.   Pertinent labs include:  Recent Labs      11/24/17   0308 11/23/17   1425   WBC  7.3  7.3   HGB  11.5  11.3*   HCT  35.4  34.9*   PLT  176  178     Recent Labs      11/26/17   0313  11/25/17   0212  11/24/17 0308 11/23/17   1425   NA  143  143  141  144   K  4.2  3.5  3.6  3.9   CL  106  106  108  110*   CO2  33*  33*  26  26   GLU  80  81  71  89   BUN  21*  22*  16  17   CREA  1.20*  1.14*  1.01  0.99   CA  8.3*  8.7  8.6  8.7   MG   --   2.0  1.4*   --    PHOS   --   3.7  3.2   --    ALB   --    --    --   3.4*   TBILI   --    --    --   0.3   SGOT   --    --    --   36   ALT   --    --    --   33       Signed: Klaudia Mahoney MD

## 2017-11-26 NOTE — PROGRESS NOTES
1300: Daughter (Vicky) expressed concerns of mothers care to [facility] (Nurse) and stated she wanted to speak to charge nurse. I spoke with Daughter for about 20 minutes. Don expressed concerns with her mother not getting bathed daily, oral care not being completed and tele sitter not working while in room. Pt has pulled out two IVs per daughter while the tele sitter has been in room. Sitter ordered and placed in room per sitter criteria screening tool. Oral care completed at this time and pt had been bathed overnight.

## 2017-11-27 VITALS
HEART RATE: 71 BPM | DIASTOLIC BLOOD PRESSURE: 56 MMHG | RESPIRATION RATE: 19 BRPM | SYSTOLIC BLOOD PRESSURE: 108 MMHG | HEIGHT: 65 IN | WEIGHT: 145.7 LBS | BODY MASS INDEX: 24.28 KG/M2 | TEMPERATURE: 97.9 F | OXYGEN SATURATION: 99 %

## 2017-11-27 LAB
ANION GAP SERPL CALC-SCNC: 6 MMOL/L (ref 5–15)
BUN SERPL-MCNC: 23 MG/DL (ref 6–20)
BUN/CREAT SERPL: 22 (ref 12–20)
CALCIUM SERPL-MCNC: 9 MG/DL (ref 8.5–10.1)
CHLORIDE SERPL-SCNC: 104 MMOL/L (ref 97–108)
CO2 SERPL-SCNC: 30 MMOL/L (ref 21–32)
CREAT SERPL-MCNC: 1.05 MG/DL (ref 0.55–1.02)
GLUCOSE SERPL-MCNC: 71 MG/DL (ref 65–100)
POTASSIUM SERPL-SCNC: 3.7 MMOL/L (ref 3.5–5.1)
SODIUM SERPL-SCNC: 140 MMOL/L (ref 136–145)

## 2017-11-27 PROCEDURE — 80048 BASIC METABOLIC PNL TOTAL CA: CPT | Performed by: GENERAL ACUTE CARE HOSPITAL

## 2017-11-27 PROCEDURE — 74011250637 HC RX REV CODE- 250/637: Performed by: INTERNAL MEDICINE

## 2017-11-27 PROCEDURE — 74011250637 HC RX REV CODE- 250/637: Performed by: GENERAL ACUTE CARE HOSPITAL

## 2017-11-27 PROCEDURE — 36415 COLL VENOUS BLD VENIPUNCTURE: CPT | Performed by: GENERAL ACUTE CARE HOSPITAL

## 2017-11-27 PROCEDURE — 77030038269 HC DRN EXT URIN PURWCK BARD -A

## 2017-11-27 RX ORDER — LANOLIN ALCOHOL/MO/W.PET/CERES
325 CREAM (GRAM) TOPICAL 2 TIMES DAILY
COMMUNITY
End: 2017-11-27

## 2017-11-27 RX ORDER — DOCUSATE SODIUM 100 MG/1
100 CAPSULE, LIQUID FILLED ORAL
Qty: 60 CAP | Refills: 2 | Status: SHIPPED | OUTPATIENT
Start: 2017-11-27 | End: 2018-02-25

## 2017-11-27 RX ORDER — FUROSEMIDE 20 MG/1
20 TABLET ORAL DAILY
COMMUNITY
End: 2017-11-27

## 2017-11-27 RX ORDER — ASCORBIC ACID 500 MG
500 TABLET ORAL EVERY EVENING
COMMUNITY

## 2017-11-27 RX ORDER — LATANOPROST 50 UG/ML
1 SOLUTION/ DROPS OPHTHALMIC DAILY
COMMUNITY

## 2017-11-27 RX ORDER — POTASSIUM CHLORIDE 600 MG/1
8 TABLET, FILM COATED, EXTENDED RELEASE ORAL DAILY
COMMUNITY
End: 2017-11-27

## 2017-11-27 RX ORDER — POTASSIUM CHLORIDE 750 MG/1
10 TABLET, FILM COATED, EXTENDED RELEASE ORAL DAILY
Status: DISCONTINUED | OUTPATIENT
Start: 2017-11-27 | End: 2017-11-27 | Stop reason: HOSPADM

## 2017-11-27 RX ORDER — POTASSIUM CHLORIDE 750 MG/1
10 TABLET, FILM COATED, EXTENDED RELEASE ORAL DAILY
Qty: 30 TAB | Refills: 1 | Status: SHIPPED | OUTPATIENT
Start: 2017-11-28

## 2017-11-27 RX ORDER — FUROSEMIDE 40 MG/1
40 TABLET ORAL DAILY
Qty: 30 TAB | Refills: 1 | Status: SHIPPED | OUTPATIENT
Start: 2017-11-27

## 2017-11-27 RX ORDER — CHLORPHENIRAMINE MALEATE 4 MG
TABLET ORAL
COMMUNITY

## 2017-11-27 RX ORDER — FUROSEMIDE 40 MG/1
40 TABLET ORAL DAILY
Qty: 30 TAB | Refills: 12 | Status: SHIPPED
Start: 2017-11-27

## 2017-11-27 RX ORDER — MEMANTINE HYDROCHLORIDE 14 MG/1
14 CAPSULE, EXTENDED RELEASE ORAL DAILY
COMMUNITY

## 2017-11-27 RX ADMIN — FUROSEMIDE 40 MG: 40 TABLET ORAL at 08:59

## 2017-11-27 RX ADMIN — ASPIRIN 81 MG 81 MG: 81 TABLET ORAL at 08:59

## 2017-11-27 RX ADMIN — DOCUSATE SODIUM 100 MG: 100 CAPSULE, LIQUID FILLED ORAL at 08:59

## 2017-11-27 RX ADMIN — BRIMONIDINE TARTRATE 1 DROP: 2 SOLUTION/ DROPS OPHTHALMIC at 09:01

## 2017-11-27 RX ADMIN — TIMOLOL MALEATE 1 DROP: 5 SOLUTION/ DROPS OPHTHALMIC at 09:01

## 2017-11-27 RX ADMIN — MEMANTINE 10 MG: 10 TABLET ORAL at 08:59

## 2017-11-27 RX ADMIN — POTASSIUM CHLORIDE 10 MEQ: 750 TABLET, FILM COATED, EXTENDED RELEASE ORAL at 14:47

## 2017-11-27 RX ADMIN — ACETAMINOPHEN 650 MG: 325 TABLET ORAL at 04:06

## 2017-11-27 NOTE — PROGRESS NOTES
Pharmacy Medication Reconciliation     The patient was interviewed regarding current PTA medication list, use and drug allergies;  the patient's daughter was present in room and obtained permission from patient to discuss drug regimen with visitor(s) present. The patient was questioned regarding use of any other inhalers, topical products, over the counter medications, herbal medications, vitamin products or ophthalmic/nasal/otic medication use. Allergy Update: Patient confirmed NKDA    Recommendations/Findings: The following amendments were made to the patient's active medication list on file at [facility]:   1) Additions:       - Furosemide 20 mg daily      - Latanoprost 1 drop in both eyes daily      - Lotrimin topical cream that she uses on white spots (per provider)    2) Deletions: None    3) Changes:        - Vitamin C- 500 mg tablet that she takes 1 daily       - Aspirin- 40.5 mg (0.5 tablet of the 81 mg) every evening       - Combigan- directions added       - Diclofenac gel- patient uses as needed       - Iron supplement- directions added       - Namenda- Changed to the XR 14 mg product        - Potassium- Patient takes 8 mEq daily       - Spironolactone- Patient only takes 1 tablet (25 mg) in the evening.       -Clarified PTA med list with Rx query and the patient's daughter. PTA medication list was corrected to the following:     Prior to Admission Medications   Prescriptions Last Dose Informant Patient Reported? Taking? BRIMONIDINE TARTRATE/TIMOLOL (COMBIGAN OP) 11/22/2017 at Unknown time  Yes Yes   Sig: Administer 1 Drop to both eyes two (2) times a day. FERROUS SULFATE 11/23/2017 at Unknown time  Yes Yes   Sig: Take 1 Tab by mouth daily. GLUCOSAMINE HCL/CHONDR JONES A NA (GLUCOSAMINE-CHONDROITIN) 750-600 mg tab 11/23/2017 at Unknown time  Yes Yes   Sig: Take 1 Tab by mouth daily.    acetaminophen (TYLENOL) 325 mg tablet 11/16/2017 at Unknown time  Yes Yes   Sig: Take 325 mg by mouth every six (6) hours as needed for Pain. ascorbic acid (VITAMIN C) 250 mg tablet 11/23/2017 at Unknown time  Yes Yes   Sig: Take 500 mg by mouth daily. ascorbic acid, vitamin C, (VITAMIN C) 500 mg tablet   Yes Yes   Sig: Take 500 mg by mouth every evening. aspirin 81 mg chewable tablet 11/23/2017 at Unknown time  Yes Yes   Sig: Take 40.5 mg by mouth every evening. Indications: pt takes 1/2 tablet   clotrimazole (LOTRIMIN AF, CLOTRIMAZOLE,) 1 % topical cream   Yes Yes   Sig: Apply  to affected area two (2) times daily as needed for Skin Irritation (White spots). cyanocobalamin (VITAMIN B-12) 1,000 mcg tablet 11/23/2017 at Unknown time  Yes Yes   Sig: Take 1,000 mcg by mouth daily. diclofenac (VOLTAREN) 1 % topical gel 11/22/2017 at Unknown time  Yes Yes   Sig: Apply 4 g to affected area three (3) times daily as needed for Pain. ferrous sulfate 325 mg (65 mg iron) tablet   Yes Yes   Sig: Take 325 mg by mouth two (2) times a day. furosemide (LASIX) 20 mg tablet   Yes Yes   Sig: Take 20 mg by mouth daily. latanoprost (XALATAN) 0.005 % ophthalmic solution   Yes Yes   Sig: Administer 1 Drop to both eyes daily. memantine ER (NAMENDA XR) 14 mg capsule   Yes Yes   Sig: Take 14 mg by mouth daily. potassium chloride SR (KLOR-CON 8) 8 mEq tablet   Yes Yes   Sig: Take 8 mEq by mouth daily. spironolactone (ALDACTONE) 25 mg tablet 11/23/2017 at Unknown time  Yes Yes   Sig: Take 25 mg by mouth every evening.       Facility-Administered Medications: None          Thank you,  Gabriella Tate, PHARMD

## 2017-11-27 NOTE — PROGRESS NOTES
Nutrition LOS Screen  LOS: 4    Assessment:  Chart reviewed, medically noted for PMH shown below. Noted for constipation, on bowel regimen. Per past documented weights, no significant weight loss PTA. Will conduct a full assessment in 3-5 days. Past Medical History:   Diagnosis Date    Anemia     Arthritis     Chronic pain     all over due to arthritis/knees/back/hand    DEMENTIA     Glaucoma     Hypertension     Other ill-defined conditions(799.89)     hiatal hernia    Other ill-defined conditions(799.89)     glaucoma    Other ill-defined conditions(799.89)     diverticulosis    Other ill-defined conditions(799.89)     2 cysts in arms    Other ill-defined conditions(799.89)     intestional blockage    Psychiatric disorder     dementia/anxiety    Stroke [facility])     mini stroke       Diet Order:  regular    % Eaten:  Patient Vitals for the past 72 hrs:   % Diet Eaten   11/25/17 0817 50 %     Pertinent Medications: [x]Reviewed: colace, lasix, KCl  Pertinent Labs: [x]Reviewed:   Food Allergies: [x]NKFA  []Other   Last BM: not documented but noted for constipation   Edema:        []RUE   []LUE   []RLE   []LLE      Pressure Ulcer:      [] Stage I   [] Stage II   [] Stage III   [] Stage IV      Wt Readings from Last 30 Encounters:   11/27/17 66.1 kg (145 lb 11.2 oz)   12/20/15 77.6 kg (171 lb)   10/13/15 77.6 kg (171 lb)   11/18/14 77.7 kg (171 lb 4 oz)   05/24/14 86.5 kg (190 lb 12.8 oz)   04/27/12 108 kg (238 lb)   02/15/12 90.3 kg (199 lb)   12/05/11 90.3 kg (199 lb)       Anthropometrics:   Height: 5' 5\" (165.1 cm) Weight: 66.1 kg (145 lb 11.2 oz)     BMI: Body mass index is 24.25 kg/(m^2).     This BMI is indicative of:   []Underweight    [x]Normal    []Overweight    [] Obesity   [] Extreme Obesity (BMI>40)       Giana Dawson RDN  Pager: [phone]

## 2017-11-27 NOTE — DISCHARGE SUMMARY
Hospitalist Discharge Summary     Patient ID:  Jeremy Berry  [id number]  80 y.o.  11/13/1931    PCP on record: Rylan Quiroz MD    Admit date: 11/23/2017  Discharge date and time: 11/27/2017      DISCHARGE DIAGNOSIS:  Acute diastolic HF, EF 55%, fluid overload improved  Bradycardia  Frequent falls  Osteoarthritis  Dementia  TIA  Glaucoma    CONSULTATIONS:  IP CONSULT TO CARDIOLOGY    Excerpted HPI from H&P of Salma Madison MD:  Vianey Mott is a 80 y.o.  female with a history of HTN, dementia, TIA and OA who presents via EMS to the ER because of frequent falls. Daughter reports that the patient has fallen 4 times in the last 1.5 weeks. Patient is not clear on why falls. Sounds like her legs just gave out as she denies dizziness, vertigo or LOC. She endorses leg swelling x 2 weeks but denies SOB, CP or orthopnea. Her PCP just recently increased her lasix dose. EMS found patient bradycardic in the 40-50s. ER evaluation demonstrates elevated proBNP 12K and cxr with central congestion. We were asked to admit for work up and evaluation of the above problems. ______________________________________________________________________  DISCHARGE SUMMARY/HOSPITAL COURSE:  for full details see H&P, daily progress notes, labs, consult notes. Acute diastolic HF, EF 61%, fluid overload improved  Bradycardia  -Leg swelling; elevated proBNP 12K; CXR central congestion  -Echo noted  -Appreciate Cardiology follow up - Aldactone discontinued and pt placed on Lasix 40mg PO daily along with KCl 10meq daily. BBlocker held due to bradycardia. -Daily weight - admission weight 170, today 150 lbs  -BUN/Cr stabilized and back at baseline 23/1.05  -Pt to follow up with PCP and Cardiology [plan for 30 day holter monitoring]  -[facility] to be [facility] to [facility]. Spoke with pt and her daughter and they agree with discharge planning. Pt denies any SOB or LE swelling. She states that she is doing well and is eager to be discharged from the hospital.      Frequent falls  Osteoarthritis  -Pain meds prn      Dementia  -mood stable.  Continue namenda    TIA  -continue ASA     Glaucoma    -Continue eye drops    _______________________________________________________________________  Patient seen and examined by me on discharge day. Pertinent Findings:  Gen:    Not in distress  Chest: Clear lungs  CVS:   Regular rhythm. No edema  Abd:  Soft, not distended, not tender  Neuro:  Alert, oriented to baseline, answers questions appropriately  _______________________________________________________________________  DISCHARGE MEDICATIONS:   Current Discharge Medication List      START taking these medications    Details   docusate sodium (COLACE) 100 mg capsule Take 1 Cap by mouth two (2) times daily as needed for Constipation for up to 90 days. Qty: 60 Cap, Refills: 2         CONTINUE these medications which have CHANGED    Details   !! furosemide (LASIX) 40 mg tablet Take 1 Tab by mouth daily. Qty: 30 Tab, Refills: 12      !! furosemide (LASIX) 40 mg tablet Take 1 Tab by mouth daily. Qty: 30 Tab, Refills: 1      potassium chloride SR (KLOR-CON 10) 10 mEq tablet Take 1 Tab by mouth daily. Qty: 30 Tab, Refills: 1       !! - Potential duplicate medications found. Please discuss with provider. CONTINUE these medications which have NOT CHANGED    Details   !! ascorbic acid, vitamin C, (VITAMIN C) 500 mg tablet Take 500 mg by mouth every evening. memantine ER (NAMENDA XR) 14 mg capsule Take 14 mg by mouth daily. latanoprost (XALATAN) 0.005 % ophthalmic solution Administer 1 Drop to both eyes daily. clotrimazole (LOTRIMIN AF, CLOTRIMAZOLE,) 1 % topical cream Apply  to affected area two (2) times daily as needed for Skin Irritation (White spots). BRIMONIDINE TARTRATE/TIMOLOL (COMBIGAN OP) Administer 1 Drop to both eyes two (2) times a day.       aspirin 81 mg chewable tablet Take 40.5 mg by mouth every evening. Indications: pt takes 1/2 tablet      cyanocobalamin (VITAMIN B-12) 1,000 mcg tablet Take 1,000 mcg by mouth daily. diclofenac (VOLTAREN) 1 % topical gel Apply 4 g to affected area three (3) times daily as needed for Pain. GLUCOSAMINE HCL/CHONDR JONES A NA (GLUCOSAMINE-CHONDROITIN) 750-600 mg tab Take 1 Tab by mouth daily. acetaminophen (TYLENOL) 325 mg tablet Take 325 mg by mouth every six (6) hours as needed for Pain. !! ascorbic acid (VITAMIN C) 250 mg tablet Take 500 mg by mouth daily. FERROUS SULFATE Take 1 Tab by mouth daily. !! - Potential duplicate medications found. Please discuss with provider. STOP taking these medications       ferrous sulfate 325 mg (65 mg iron) tablet Comments:   Reason for Stopping:         spironolactone (ALDACTONE) 25 mg tablet Comments:   Reason for Stopping:               My Recommended Diet, Activity, Wound Care, and follow-up labs are listed in the patient's Discharge Insturctions which I have personally completed and reviewed.     _______________________________________________________________________  DISPOSITION:    Home with Family:    Home with HH/PT/OT/RN:    HERNANDEZ/LTC: y   ORALIA:    OTHER:        Condition at Discharge:  Stable  _______________________________________________________________________  Follow up with:   PCP : Marilee Perez MD  Follow-up Information     Follow up With Details Tracy Zavala MD In 2 weeks  [facility] [address]  Carla Layne MD Schedule an appointment as soon as possible for a visit in 1 week  [address]  [id number]  [facility] [address] [phone] [address] [facility]   [address]  [address] [phone]                Total time in minutes spent coordinating this discharge (includes going over instructions, follow-up, prescriptions, and preparing report for sign off to her PCP) :  35 minutes    Signed:  Nisreen Ovalles MD

## 2017-11-27 NOTE — ROUTINE PROCESS
PCP follow up appointment scheduled and placed on AVS.   Kelsi Smith, Adore Silence Specialist  [facility]  [phone]

## 2017-11-27 NOTE — ROUTINE PROCESS
Bedside shift change report given to RN (oncoming nurse) by Oneida Manjarrez (offgoing nurse). Report included the following information SBAR. SHIFT SUMMARY:    Pt plan for dc to SNF tomorrow, pedal edema improved since admit, pt cooperative and following commands, will d/c sitter per rounds, holter monitor plan for outpatient, continue pain reassment for OA of knee. [address] NURSING NOTE   Admission Date 11/23/2017   Admission Diagnosis Acute CHF (congestive heart failure) ([facility] [address]   Consults IP CONSULT TO CARDIOLOGY      Cardiac Monitoring [] Yes [] No      Purposeful Hourly Rounding [] Yes    Fauzia Score Total Score: 5   Fauzia score 3 or > [] Bed Alarm [] Avasys [] 1:1 sitter [] Patient refused (Place signed refusal form in chart)   Sincere Score Sincere Score: 13   Sincere score 14 or < [] PMT consult [] Wound Care consult    []  Specialty bed  [] Nutrition consult      Influenza Vaccine Received Flu Vaccine for Current Season (usually Sept-March): No    Patient/Guardian Refused (Notify MD): Yes      Oxygen needs? [] Room air Oxygen @  []1L    []2L    []3L   []4L    []5L   []6L     Use home O2? [] Yes [] No  Perform O2 challenge test using  smartphrase (.Homeoxygen)      Last bowel movement        Urinary Catheter       External Female Catheter 11/23/17-Urine Output (mL): 200 ml     LDAs               Peripheral IV 11/26/17 Left Antecubital (Active)   Site Assessment Clean, dry, & intact 11/27/2017 10:09 AM   Phlebitis Assessment 0 11/27/2017 10:09 AM   Infiltration Assessment 0 11/27/2017 10:09 AM   Dressing Status Clean, dry, & intact 11/27/2017 10:09 AM   Dressing Type Transparent 11/27/2017 10:09 AM   Hub Color/Line Status Blue 11/27/2017 10:09 AM   Action Taken Blood drawn; Wrapped 11/26/2017  3:00 AM   Alcohol Cap Used Yes 11/27/2017 10:09 AM          External Female Catheter 11/23/17 (Active)   Site Assessment Intact 11/27/2017 10:09 AM   Repositioned Yes 11/27/2017 10:09 AM   Perineal Care Yes 11/27/2017 10:09 AM   Wikasia Changed Yes 11/27/2017 10:09 AM   Suction Canister/Tubing Changed No 11/27/2017 10:09 AM   Urine Output (mL) 200 ml 11/25/2017  3:15 PM                   Readmission Risk Assessment Tool Score Medium Risk            15       Total Score        3 Has Seen PCP in Last 6 Months (Yes=3, No=0)    2 . Living with Significant Other. Assisted Living. LTAC. SNF. or   Rehab    5 Pt.  Coverage (Medicare=5 , Medicaid, or Self-Pay=4)    5 Charlson Comorbidity Score (Age + Comorbid Conditions)        Criteria that do not apply:    Patient Length of Stay (>5 days = 3)    IP Visits Last 12 Months (1-3=4, 4=9, >4=11)       Expected Length of Stay 2d 12h   Actual Length of Stay 4

## 2017-11-27 NOTE — PROGRESS NOTES
Pressure Ulcer Prevention Alert Received for Sincere < 14 (moderate risk).        Care Plan/Interventions for Nursin. Complete Sincere Pressure Ulcer Risk Scale and use sub scores to identify appropriate interventions. 2. Perform Assessment: skin, changes in LOC, visual cues for pain, monitor skin under medical devices  3. Respond to Reduced Sensory Perception: changes in LOC, check visual cues for pain, float heels, suspension boots, pressure redistribution bed/mattress/chair cushion, turning and reposition approximately every 2 hours (pillows & wedges), pad between skin to skin, turn & reposition  4. Manage Moisture: absorbent under pads, internal / external urinary device, internal /  external fecal device, minimize layers, contain wound drainage, access need for specialty bed, limit adult briefs, maintain skin hydration (lotion/cream), moisture barrier, offer toileting every hour  5. Promote Activity: increase time out of bed, chair cushion, PT/OT evaluation, trapeze to reposition, pressure redistribution bed/mattress/chair  6. Address Reduced Mobility: float heels / suspension boot, HOB 30 degrees or less, pressure redistribution bed/mattress/cushion, PT / OT evaluation, turn and reposition approximately every 2 hours (pillows & wedges)  7. Promote Nutrition: document food / fluid / supplement intake, encourage/assist with meals as needed  8. Reduce Friction and Shear: transferring/repositioning devices (lift/draw sheet), lift team/ patient mobility team, feet elevated on foot rest, minimize layers, foam dressing / transparent film / skin sealants, protective barrier creams and emollients, transfer aides (board, Guerline lift, ceiling lift, stand assist), HOB 30 degrees or less, trapeze to reposition.   Wound Care Team

## 2017-11-27 NOTE — PROGRESS NOTES
[address] [place] [address] has accepted patient for rehab. [facility] ambulance has been scheduled for 4:30 PM. Patient/daughter are in agreement to SNF transfer to [facility]. Nursing to call report to [phone]. Care Management Interventions  PCP Verified by CM:  Yes  Last Visit to PCP: 11/20/17  Palliative Care Criteria Met (RRAT>21 & CHF Dx)?: No  Mode of Transport at Discharge: BLS  Transition of Care Consult (CM Consult): SNF ([address] [place] [address])  Partner SNF: Yes  Discharge Durable Medical Equipment: No (Pt has access to a rollater, raised toilet seat, grab bars, shower chair,)  Physical Therapy Consult: Yes  Occupational Therapy Consult: Yes  Speech Therapy Consult: No  Current Support Network: Lives with Spouse, Own Home (Pt lives in a 2 story home with her )  Confirm Follow Up Transport: Family (Pt does not drive, but has supportive familyto assist with transportation as needed)  Plan discussed with Pt/Family/Caregiver: Yes  Discharge Location  Discharge Placement: Skilled nursing facility

## 2017-11-27 NOTE — PROGRESS NOTES
Cardiology Progress Note  11/27/2017     Admit Date: 11/23/2017  Admit Diagnosis: Acute CHF (congestive heart failure) ([facility])  CC: none currently  Cardiac Assessment/Plan:   Recurrent falls: appears non-cardiac: neg tele monitoring and not orthostatic. \"acute diastolic\" CHF: Ef nomal. No beta blocker due to low HR. Resumed ASA. I dont think she needs 2 diuretics: d/cd aldactone; cont PO lasix 40mg daily with KCl 10. HTN: stable; not orthostatic. Rhythm: sinus/sinus dawood with PVCs: no current indication for PPM (her daughter is willing to consider PPM if needed to prevent falls). Rec: outpt 30 day event monitor; my office will set this up. Renal function: stable    Dementia, Falls, Dispo: all per primary team.    Stable cardiac status; low risk for discharge from cardiac standpoint. F/U with PCP and myself after d/c. Please call if questions/issues. Thanks. Echo 11/24: Ef 55%; no valve disease; PAp 24. For other plans, see orders. As per H/P: \"86 y.o.  female with a history of HTN, dementia, TIA and OA who presents via EMS to the ER because of frequent falls. Daughter reports that the patient has fallen 4 times in the last 1.5 weeks. Patient is not clear on why falls. Sounds like her legs just gave out as she denies dizziness, vertigo or LOC. She endorses leg swelling x 2 weeks but denies SOB, CP or orthopnea. Her PCP just recently increased her lasix dose. EMS found patient bradycardic in the 40-50s. ER evaluation demonstrates elevated proBNP 12K and cxr with central congestion\"    Also, apparently dilt was d/cd by PCP a days PTA.  ________________________________________________________________________  @ last OV 8/11/16:       Ms Milan Moffett has a h/o HTN, intermittent vertigo/ataxia (neg MRI 2014 except extensive WMD) and significant dementia; Her daughter Renan Oliver, pt of mine) cares for her and her  at night. The pt cannot participate in medical decision making; her daughter would like a non-invasive approach to care. 5/2016: Intermittent vertigo noted; No angina/CARRERO. Her HR was reportedly palpated at 38 on 4/27 PCP visit: ecg from then shows HR in 70s with PVC. No current complaints: mild carotid disease; echo unremarkable. 8/2016: No Sx; Dilt and aldactone decreased by PCP; dose unknown (?CKD). Did not supply current meds list, will call with updated meds. IMPRESSION AND PLAN  01. Bradycardia:  Neg holter 5/2016: no Rx needed at this point. ECG done today   02. Occlusion and stenosis of bilateral carotid arteries:  Mild carotid disease; repeat next year. Carotid Duplex to be done as specified in the orders. 03. Hypertensive heart disease without heart failure:  Adequately controlled. 04. 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   05. Long term (current) use of aspirin:  This condition is stable. 06. Personal history of nicotine dependence:  remains abstinent from tobacco use. 07. Body mass index (BMI) 28.0-28.9, adult     ORDERS:  1 ECG done today   2 Carotid Duplex in 1 Year. 3 Giving encouragement to exercise   4 Dietary management education, guidance, and counseling   5 Return office visit with Katie Tan. Ashlie MARK in 1 Year.      8/11/16 MEDICATION LIST    Medication Sig Description   aspirin 325 mg tablet take 1 tablet by oral route  every day   Cartia  mg capsule,extended release changed by PCP   Glucosamine 500 mg tablet as directed   iron  mg (65 mg iron) capsule,extended release take 1 by Oral route  every day   Klor-Con 8 mEq tablet,extended release take 1 tablet by oral route  every day with food   Namenda XR 14 mg capsule sprinkle,extended release take 1 capsule by oral route  every day   spironolactone 50 mg tablet changed by PCP   Tylenol 325 mg tablet take 2 tablet by oral route  every 4 hours as needed   vitamin B complex tablet 1 po qd   Vitamin C 500 mg tablet one po qd     __________________________________________________________________________________________  CARDIAC HISTORY  ARRHYTHMIA:  1 Bradycardia [Neg holter.] - 2016     RISK FACTORS:  1 Hypertension       CARDIOVASCULAR PROCEDURES  ECHO/MUGA:  Echo (EF 0.55 (55%), Mild LVH, Mild TR, Mild AR, Mild MR, LA Diam 4.4 cm, Est RVSP 41 mmHg, Normal RV.) - 2016   ELECTROPHYSIOLOGY:  EKG (Occasional PVCs, Sinus Rhythm, LAFB, poor r wave progression. No change.) - 2016   Holter (Sinus Rhythm, No Malignant Arrhythmias, (55-91, ave 71); no significant bradycardia); Incidental ASx 6 beats PAT. ) - 2016   VASCULAR:  Carotid Duplex (1 - 49% Bilateral ICAs, Vertebral: Bilateral Antegrade Flow) - 2016       ALLERGIES/INTOLERANCES:    Ingredient Reaction Medication Name Comment   NO KNOWN ALLERGIES        None    PROBLEM LIST:    Problem Description Chronic   Benign essential HTN Y   Bradycardia Y         PAST MEDICAL/SURGICAL HISTORY  (Detailed)    Disease/disorder Onset Date Management Date Comments     Appendectomy     cyst on arm       dementia       Hypertension             Family History  (Detailed)    Relationship Family Member Name  Age at Death Condition Onset Age Cause of Death   Father  Y  heart issues  Y   Mother  Y  Asthma  N   SOCIAL HISTORY  (Detailed)  Tobacco use reviewed. Preferred language is [place]. EDUCATION/EMPLOYMENT/OCCUPATION    Employment History Status Retired Restrictions     retired       retired       retired     Smoking status: Former smoker. SMOKING STATUS  Use Status Type Smoking Status Usage Per Day Years Used Total Pack Years   yes  Former smoker            Hospital problem list   C/Sai Thakkar 1106 Problems    Diagnosis Date Noted    Acute CHF (congestive heart failure) ([facility] [address] 2017        Subjective:  Raj Burnett reports   Chest pain X none  consistent with:  Non-cardiac CP         Atypical CP     None now On going  Anginal CP     Dyspnea X none  at rest  with exertion         improved  unchanged  worse              PND X none  overnight       Orthopnea X none  improved  unchanged  worse   Presyncope X none  improved  unchanged  worse     Ambulated in hallway without symptoms   Yes   Ambulated in room without symptoms  Yes   Objective:    Physical Exam:  Overall VSSAF;    Visit Vitals    /65 (BP 1 Location: Right arm, BP Patient Position: Sitting)    Pulse 62    Temp 97.3 °F (36.3 °C)    Resp 19    Ht 5' 5\" (1.651 m)    Wt 66.1 kg (145 lb 11.2 oz)    SpO2 100%    BMI 24.25 kg/m2     Temp (24hrs), Av.9 °F (36.6 °C), Min:97.3 °F (36.3 °C), Max:98.5 °F (36.9 °C)    Patient Vitals for the past 8 hrs:   Pulse   17 0735 62   17 0350 80    Patient Vitals for the past 8 hrs:   Resp   17 0735 19   17 0350 18    Patient Vitals for the past 8 hrs:   BP   17 0735 150/65   17 0350 155/57          Intake/Output Summary (Last 24 hours) at 17 0804  Last data filed at 17 1839   Gross per 24 hour   Intake                0 ml   Output             1200 ml   Net            -1200 ml     General Appearance: Well developed, well nourished, no acute distress. Ears/Nose/Mouth/Throat:   Normal MM; anicteric. JVP: WNL   Resp:   clear to auscultation bilaterally. Nl resp effort. Cardiovascular:  RRR, S1, S2 normal, no new murmur. No gallop or rub. Abdomen:   Soft, non-tender, bowel sounds are present. Extremities: 1+ edema bilaterally. Skin:  Neuro: Warm and dry. A; pleasantly demented, grossly nonfocal   cath site intact w/o hematoma or new bruit; distal pulse unchanged  Yes   Data Review:     Telemetry independently reviewed x sinus  chronic afib  parox afib  NSVT     ECG independently reviewed  NSR  afib  no significant changes  NSST-Tw chgs   x no new ECG provided for review   Lab results reviewed as noted below.   Current medications reviewed as noted below. No results for input(s): PH, PCO2, PO2 in the last 72 hours. No results for input(s): CPK, CKMB, CKNDX, TROIQ in the last 72 hours. Recent Labs      11/27/17   0430  11/26/17   0313  11/25/17   0212   NA  140  143  143   K  3.7  4.2  3.5   CL  104  106  106   CO2  30  33*  33*   BUN  23*  21*  22*   CREA  1.05*  1.20*  1.14*   GLU  71  80  81   PHOS   --    --   3.7   CA  9.0  8.3*  8.7     Lab Results   Component Value Date/Time    Cholesterol, total 185 11/19/2014 05:04 AM    HDL Cholesterol 71 11/19/2014 05:04 AM    LDL, calculated 102.4 11/19/2014 05:04 AM    Triglyceride 58 11/19/2014 05:04 AM    CHOL/HDL Ratio 2.6 11/19/2014 05:04 AM     No results for input(s): SGOT, GPT, AP, TBIL, TP, ALB, GLOB, GGT, AML, LPSE in the last 72 hours. No lab exists for component: AMYP, HLPSE  No results for input(s): INR, PTP, APTT in the last 72 hours.     No lab exists for component: INREXT, INREXT   No components found for: [place]    Current Facility-Administered Medications   Medication Dose Route Frequency    potassium chloride SR (KLOR-CON 10) tablet 10 mEq  10 mEq Oral DAILY    furosemide (LASIX) tablet 40 mg  40 mg Oral DAILY    aspirin chewable tablet 81 mg  81 mg Oral DAILY    docusate sodium (COLACE) capsule 100 mg  100 mg Oral BID    latanoprost (XALATAN) 0.005 % ophthalmic solution 1 Drop  1 Drop Both Eyes QPM    brimonidine (ALPHAGAN) 0.2 % ophthalmic solution 1 Drop  1 Drop Both Eyes Q12H    timolol (TIMOPTIC) 0.5 % ophthalmic solution 1 Drop  1 Drop Both Eyes Q12H    memantine (NAMENDA) tablet 10 mg  10 mg Oral BID    sodium chloride (NS) flush 5-10 mL  5-10 mL IntraVENous Q8H    sodium chloride (NS) flush 5-10 mL  5-10 mL IntraVENous PRN    acetaminophen (TYLENOL) tablet 650 mg  650 mg Oral Q4H PRN    ondansetron (ZOFRAN) injection 4 mg  4 mg IntraVENous Q4H PRN    enoxaparin (LOVENOX) injection 40 mg  40 mg SubCUTAneous Q24H        David Amador MD

## 2017-11-27 NOTE — PROGRESS NOTES
Heart Failure Care Coordinator visited the patient in room met with daughter Any Mcdowell introduction to self, and explanation of the Care Coordinator role. Daughter was provided a copy of the [facility] letter. Daughter made aware of contact information should any questions arise before or after discharge.

## 2017-11-27 NOTE — CARDIO/PULMONARY
Cardiopulmonary Rehab:     Chart Reviewed: Pt is on the CHF bundle list.  Patient admitted for frequent falls. Medical History: HTN, dementia, TIA, OA  EF 55%, Echo 11/24/2017  Former Smoker     Met with daughter as patient was sleeping and has dementia. Pt visited. This was a follow-up visit to answer questions and reinforce prior teaching re: CHF, S&Ss, medication management, Low NA diet, daily weights and when to call the doctor. Active listening/emotional support provided. Advised daughter to ensure pt has low sodium diet provided and that daily weights are done each morning at SNF. Pt may be discharged to rehab. Daughter verbalized understanding.

## 2017-11-27 NOTE — DISCHARGE INSTRUCTIONS
Learning About Heart Failure  What is heart failure? Heart failure means that your heart muscle does not pump as much blood as your body needs. Failure does not mean that your heart has stopped. It means that your heart is not pumping as well as it should. Your body has an amazing ability to make up for heart failure. It may do such a good job that you don't know you have a disease. But at some point, your heart and body will no longer be able to keep up. Then fluid starts to build up in your lungs and other parts of your body. What can you expect when you have heart failure? Heart failure is a lifelong (chronic) disease. Treatment may be able to slow the disease and help you feel better. But heart failure tends to get worse over time. Despite this, there are many steps you can take to feel better and stay healthy longer. Early on, your symptoms may not be too bad. As heart failure gets worse, symptoms typically get worse, and you may need to limit your activities. Heart failure can also get worse suddenly. If this happens, you need emergency care. Then, after treatment, your symptoms may go back to being stable (which means they stay the same) for a long time. Heart failure can lead to other health problems, such as heart rhythm problems. Over time, your treatment options may change, especially as your symptoms get worse. As heart failure gets worse, palliative care can help improve the quality of your life. You can do advance care planning todecide what kind of care you want at the end of your life. What are the symptoms? Symptoms of heart failure start to happen when your heart can't pump enough blood to the rest of your body. In the early stages of heart failure, you may:  · Feel tired easily. · Be short of breath when you exert yourself. · Feel like your heart is pounding or racing (palpitations). · Feel weak or dizzy.   As heart failure gets worse, fluid starts to build up in your lungs and other parts of your body. This may cause you to:  · Feel short of breath even at rest.  · Have swelling (edema), especially in your legs, ankles, and feet. · Gain weight. This may happen over just a day or two, or more slowly. · Cough or wheeze, especially when you lie down. How is heart failure treated? · You'll probably take several medicines. · You might attend cardiac rehabilitation (rehab) to get education and support that help you make lifestyle changes and stay as healthy as possible. · You may get a heart device. A pacemaker helps your heart pump blood. An ICD can stop abnormal heart rhythms. How can you care for yourself? There are many steps you can take to feel better and stay healthy longer. These steps are an important part of treatment. They can help you stay active and enjoy life. · Take your medicine the right way. Avoid medicines that can make your symptoms worse. · Check your weight and symptoms every day. Know what to do if your symptoms get worse. · Limit sodium. This helps keep fluid from building up. It may help you feel better. · Be active. Exercise regularly, but don't exercise too hard. · Be heart-healthy. Eat healthy foods, stay at a healthy weight, limit alcohol, and don't smoke. · Stay as healthy as possible. Avoid colds and flu, get help for depression and anxiety, and manage stress. Follow-up care is a key part of your treatment and safety. Be sure to make and go to all appointments, and call your doctor if you are having problems. It's also a good idea to know your test results and keep a list of the medicines you take. Where can you learn more? Go to http://césar-radha.info/. Enter S610 in the search box to learn more about \"Learning About Heart Failure. \"  Current as of: September 21, 2016  Content Version: 11.4  © 2173-8851 [employer]wise, [employer].  Care instructions adapted under license by [employer] (which disclaims liability or warranty for this information). If you have questions about a medical condition or this instruction, always ask your healthcare professional. [place] [address] any warranty or liability for your use of this information.

## 2017-11-28 ENCOUNTER — PATIENT OUTREACH (OUTPATIENT)
Dept: CASE MANAGEMENT | Age: 82
End: 2017-11-28

## 2017-11-29 ENCOUNTER — PATIENT OUTREACH (OUTPATIENT)
Dept: CASE MANAGEMENT | Age: 82
End: 2017-11-29

## 2017-11-29 NOTE — PROGRESS NOTES
Community Care Team Documentation for Patient in [facility]  Initial Follow Up       Patient was admitted to [facility] from 11/23/17 to 11/27/17. Patient was discharged to [facility], on 11/27/17 (date). Community Care Team followed up to [address] [place] [address] during this transition. Hospital Discharge diagnosis:  CHF; HF Bundle. RRAT score: 15    Advance Medical Directive on file in EMR? No    Total Hospitalizations/ED visits last 6 months? IP - 1; ED - 1    PCP : Lisa Hardin MD    SNF Attending:  Yoli Kirkpatrick MD  Per Osvaldo Brought at [facility], Reviewed Severy Back questions with SNF to ensure patient arrived with admission packet in order. HF Bundle patient - 90 day end date 2/25/18. PT/OT evaluations to be completed today. Patient's weight on 11/29 at SNF is 138.9 lbs. Confirmed SNF is aware of need for followup with Dr. Sidney Capellan in 1 week. Community Care Team will follow up weekly with Kurt Monsivais. Medications were not reconciled and general patient assessment was not completed during this skilled nursing facility outreach.      Torrey De Luna, MSN, RN, ACNS-BC, [facility]  Nurse Navigator, [employer] [phone]

## 2017-11-30 LAB
ATRIAL RATE: 69 BPM
CALCULATED P AXIS, ECG09: 59 DEGREES
CALCULATED R AXIS, ECG10: -50 DEGREES
CALCULATED T AXIS, ECG11: -13 DEGREES
DIAGNOSIS, 93000: NORMAL
P-R INTERVAL, ECG05: 140 MS
Q-T INTERVAL, ECG07: 434 MS
QRS DURATION, ECG06: 88 MS
QTC CALCULATION (BEZET), ECG08: 465 MS
VENTRICULAR RATE, ECG03: 69 BPM

## 2017-12-06 ENCOUNTER — PATIENT OUTREACH (OUTPATIENT)
Dept: CASE MANAGEMENT | Age: 82
End: 2017-12-06

## 2017-12-06 NOTE — PROGRESS NOTES
Community Care Team Documentation for Patient in [facility]  Subsequent Follow up     Patient remains at [address] ([facility]). See previous [facility] Team notes. PCP : Cruz Alba MD    Per Wilbur Phan and team at [facility], HF Bundle patient - 80 day end date 2/25/18. Patient has follow up with Dr. Khadra Bush, Cardiology, on 12/8 at 11:15 AM.  Weight is stable at 135 lbs on 12/5. Patient is on a liberalized diet due to low PO intake. PT/OT continues: ambulating 100-200 ft with walker at contact guard assist; transfers and up/down 6 stairs at contact guard assist; self feeding and grooming with supervision and cues; min assist for upper body and lower body ADLs. Patient does not want to pay copay. Plan for DC 12/17 to home with spouse and [facility] for telemonitoring. Daughter to assist.  PCP follow up 12/22 at 11:45 AM.    Medications were not reconciled and general patient assessment was not completed during this skilled nursing facility outreach.      Brittany Shin, MSN, RN, ACNS-BC, [facility]  Nurse Navigator, [employer] [phone]

## 2017-12-13 ENCOUNTER — PATIENT OUTREACH (OUTPATIENT)
Dept: CASE MANAGEMENT | Age: 82
End: 2017-12-13

## 2017-12-13 NOTE — PROGRESS NOTES
Community Care Team Documentation for Patient in [facility]  Subsequent Follow up     Patient remains at [facility] ([facility]). See previous [facility] Team notes. PCP : Nas Ocampo MD    Per Kimberlyn Hamm at [facility], HF Bundle patient - 80 day end date 2/25/18. Pt continues to make progress with PT/OT. Weight stable at 139.1lbs. SNF monitoring bradycardia. Plan to discharge 12/17 home with spouse and [facility] for tele monitoring due to copay. PCP follow up 12/22 at 11:45 AM. SNF SW to discuss transportation to follow up with pt's spouse. Medications were not reconciled and general patient assessment was not completed during this skilled nursing facility outreach.      CAT McculloughW

## 2017-12-13 NOTE — PROGRESS NOTES
Heart Failure Care Coordinator participated in [address] [place] [address] Dr with  RODO LOYA [facility] by telephone to perform CHF bundle Follow Up, Verified Name and  as identifiers. Spoke to SNF staff member, Chirag Carlson team rounds regarding the following:  Pt weights daily- current wt. 139.1 lbs. Pt taking medications daily as directed  Per SNF Protocol    Pt adhering to a heart healthy diet Per SNF Protocol  Discharge date and disposition 17  Home with spouse, JAQUELIN VELASQUEZ [facility]- telemonitoring  Follow up [address] PCP ;  Spouse to transport patient to apt. Noted concerns: Pt weight up slightly for the week, however within protocol (less than 5 pounds in one week). Monitoring per SNF protocol. NEXT TOUCH POINT  17   No Medications Reconciled at this time     SNF Staff reminded that the physicians are on call 24 hours a day / 7 days a week should the patient/staff  have questions or concerns.

## 2017-12-19 ENCOUNTER — PATIENT OUTREACH (OUTPATIENT)
Dept: CASE MANAGEMENT | Age: 82
End: 2017-12-19

## 2017-12-20 ENCOUNTER — PATIENT OUTREACH (OUTPATIENT)
Dept: CASE MANAGEMENT | Age: 82
End: 2017-12-20

## 2017-12-20 NOTE — PROGRESS NOTES
Community Care Team Documentation for Patient in [facility]  Discharge Note    Per SNF staff, patient discharged from [facility] ([facility]). See previous [facility] Team notes. PCP : Marilee Perez MD    Per Emmanuelle Pappas  at [facility],  HF Bundle patient - 80 day end date 2/25/18. Pt discharged 12/17 due to copay- home with spouse and [facility]. PCP follow up 12/22 at 11:45 AM.     [facility] will sign off at this time. Medications were not reconciled and general patient assessment was not completed during this skilled nursing facility outreach.

## 2017-12-21 ENCOUNTER — PATIENT OUTREACH (OUTPATIENT)
Dept: CASE MANAGEMENT | Age: 82
End: 2017-12-21

## 2017-12-21 ENCOUNTER — PATIENT OUTREACH (OUTPATIENT)
Dept: CARDIOLOGY CLINIC | Age: 82
End: 2017-12-21

## 2017-12-21 NOTE — PROGRESS NOTES
This note will not be viewable in [address] Heart Failure Initial Call    CHF coordinator contacted the patient by telephone to perform CHF Follow Up. Verified  and address as identifiers. Noted Priorities/Plan:  Continued education with daughter Guanaco Downey regarding CHF, Diet,and  medications. HomeHealth collected UA to test for UTI per family request. Discussed Sepsis and S/S to address such as fever, chills, increased HR, diaphoresis, tenting of the skin, and LOC changes. Daughter verbalized understanding. Daily Weight (document daily weights in flowsheets):   stable   Amount:  135      Provider Notified:   No     Zone:(Pt Reported)  green     Goals      Improve activity tolerance and quality of life. Patient' cargeiver encouraged in allowing patient to stay active. Encouraged in participating in activities such as walking, and self-paced ADLs. Patient encouraged in balancing activity and rest, participating in activity as tolerated. 17 patient caregiver stated patient can work with PT with limited episodes of shortness of breath. 17 caregiver stated patient able to perform self-paced ADLs with limited supervision.  Knowledge and adherence medication (ie. action, side effects, missed dose, etc.)            Knowledge and adherence to Medications:  17 caregiver educated on assisting patient with obtaining and taking medications daily as prescribed. Caregiver will be able to address the followin/28/17 Validate knowledge of names, dose, purpose and (basic) side effect of CHF medications. 12/22/17 Assessed patient is taking medications daily as prescribed. 12/22/17 Assessed caregiver verbalizes understanding related to importance of obtaining and taking medications daily as prescribed. 12/22/17  Assessed preferred method of obtaining medications-daughter Vicky picks up medications.  Understands and adheres to diet. Patient educated on the importance of following a heart healthy low sodium diet. Patient educated on the importance of restricting sodium by avoiding sodium rich foods such as processed meats, canned soups, and condiments Restricting sodium in diet will help decrease \"water weight\" as well as control blood pressure. Too much salt in the diet can worsen symptoms such as swelling and SOB. 12/28/17  Patient caregiver will be able to verbalize reasoning for restriction of sodium in CHF. caregiver mailed business card of dietician per request.    12/22/17 patient caregiver independently  listed foods to avoid due to high sodium content. Referred patient to the American Heart association web site for dietary advice. mailed AHA holiday eating tips to caregiver address per request.             Needs addressed from pathway: (document the week that pt is in the pathway)   Week 1-4   Provide Daily Disease Management  (NN initiated)  ? Daily weight (Before Breakfast-  Daily Zone Identification (symptom management; weight, edema, SOB, activity/sleep changes)-notify provider immediately as indicated  ? Cardiac Low-sodium Diet (No added sodium; 1500mg as indicated)Fluid restriction (if indicated)  ? Comorbidity Management-UTI- Sepsis  Additional assessment   ? Fall precautions    ?  Activity tolerance assessment   (eg: Vital signs; level of consciousness; dyspnea on exertion; pillow usage; recliner vs bed)   ? Energy conservation management (balance activity with rest)  ? Diet/appetite assessment         PCP: Clement Carbone  Cardiologist: Ashlie    Cardiologist consult while IP: yes     Palliative consult while IP:    N/A     EF:55%(result) on 11/17 (date)   Type of HF:   HFpEF       Heart Failure Medications: Diuretic, Potassium     Disposition of patient:  Home, Home Health     [facility] Services/Tele Monitoring:  Home Health/[facility]     Social support: Daughter Jaren Tavares you have a Scale:    yes   How often do you weigh:  daily  Does patient have an Advance Directive:  not on file     Advance Directive scanned into patients chart:  No     Patient reminded that there are physicians on call 24 hours a day / 7 days a week (M-F 5pm to 8am and from Friday 5pm until Monday 8a for the weekend) should the patient have questions or concerns. Patient reminded to call 911 if situation is emergent or patient feels the situation is emergent. Pt verbalizes understanding.

## 2017-12-22 ENCOUNTER — HOSPICE ADMISSION (OUTPATIENT)
Dept: HOSPICE | Facility: HOSPICE | Age: 82
End: 2017-12-22

## 2017-12-23 ENCOUNTER — HOME CARE VISIT (OUTPATIENT)
Dept: SCHEDULING | Facility: HOME HEALTH | Age: 82
End: 2017-12-23

## 2017-12-23 ENCOUNTER — HOME CARE VISIT (OUTPATIENT)
Dept: HOSPICE | Facility: HOSPICE | Age: 82
End: 2017-12-23

## 2018-01-15 ENCOUNTER — HOSPITAL ENCOUNTER (EMERGENCY)
Age: 83
Discharge: HOME | End: 2018-01-15
Attending: EMERGENCY MEDICINE
Payer: MEDICARE

## 2018-01-15 VITALS
SYSTOLIC BLOOD PRESSURE: 125 MMHG | TEMPERATURE: 97.4 F | OXYGEN SATURATION: 100 % | RESPIRATION RATE: 12 BRPM | DIASTOLIC BLOOD PRESSURE: 66 MMHG | HEART RATE: 55 BPM

## 2018-01-15 DIAGNOSIS — F03.918 DEMENTIA WITH AGGRESSIVE BEHAVIOR: Primary | ICD-10-CM

## 2018-01-15 LAB
ALBUMIN SERPL-MCNC: 3.3 G/DL (ref 3.5–5)
ALBUMIN/GLOB SERPL: 0.9 {RATIO} (ref 1.1–2.2)
ALP SERPL-CCNC: 66 U/L (ref 45–117)
ALT SERPL-CCNC: 18 U/L (ref 12–78)
ANION GAP SERPL CALC-SCNC: 4 MMOL/L (ref 5–15)
APPEARANCE UR: CLEAR
AST SERPL-CCNC: 17 U/L (ref 15–37)
BACTERIA URNS QL MICRO: ABNORMAL /HPF
BASOPHILS # BLD: 0 K/UL (ref 0–0.1)
BASOPHILS NFR BLD: 0 % (ref 0–1)
BILIRUB SERPL-MCNC: 0.3 MG/DL (ref 0.2–1)
BILIRUB UR QL: NEGATIVE
BUN SERPL-MCNC: 18 MG/DL (ref 6–20)
BUN/CREAT SERPL: 16 (ref 12–20)
CALCIUM SERPL-MCNC: 8.8 MG/DL (ref 8.5–10.1)
CHLORIDE SERPL-SCNC: 104 MMOL/L (ref 97–108)
CO2 SERPL-SCNC: 33 MMOL/L (ref 21–32)
COLOR UR: ABNORMAL
CREAT SERPL-MCNC: 1.15 MG/DL (ref 0.55–1.02)
EOSINOPHIL # BLD: 0.3 K/UL (ref 0–0.4)
EOSINOPHIL NFR BLD: 4 % (ref 0–7)
EPITH CASTS URNS QL MICRO: ABNORMAL /LPF
ERYTHROCYTE [DISTWIDTH] IN BLOOD BY AUTOMATED COUNT: 13.9 % (ref 11.5–14.5)
GLOBULIN SER CALC-MCNC: 3.6 G/DL (ref 2–4)
GLUCOSE SERPL-MCNC: 101 MG/DL (ref 65–100)
GLUCOSE UR STRIP.AUTO-MCNC: NEGATIVE MG/DL
HCT VFR BLD AUTO: 37.6 % (ref 35–47)
HGB BLD-MCNC: 12 G/DL (ref 11.5–16)
HGB UR QL STRIP: NEGATIVE
KETONES UR QL STRIP.AUTO: NEGATIVE MG/DL
LEUKOCYTE ESTERASE UR QL STRIP.AUTO: NEGATIVE
LYMPHOCYTES # BLD: 2 K/UL (ref 0.8–3.5)
LYMPHOCYTES NFR BLD: 33 % (ref 12–49)
MCH RBC QN AUTO: 27.2 PG (ref 26–34)
MCHC RBC AUTO-ENTMCNC: 31.9 G/DL (ref 30–36.5)
MCV RBC AUTO: 85.3 FL (ref 80–99)
MONOCYTES # BLD: 0.9 K/UL (ref 0–1)
MONOCYTES NFR BLD: 14 % (ref 5–13)
NEUTS SEG # BLD: 3 K/UL (ref 1.8–8)
NEUTS SEG NFR BLD: 49 % (ref 32–75)
NITRITE UR QL STRIP.AUTO: NEGATIVE
PH UR STRIP: 6 [PH] (ref 5–8)
PLATELET # BLD AUTO: 220 K/UL (ref 150–400)
POTASSIUM SERPL-SCNC: 4.2 MMOL/L (ref 3.5–5.1)
PROT SERPL-MCNC: 6.9 G/DL (ref 6.4–8.2)
PROT UR STRIP-MCNC: NEGATIVE MG/DL
RBC # BLD AUTO: 4.41 M/UL (ref 3.8–5.2)
RBC #/AREA URNS HPF: ABNORMAL /HPF (ref 0–5)
SODIUM SERPL-SCNC: 141 MMOL/L (ref 136–145)
SP GR UR REFRACTOMETRY: 1.02 (ref 1–1.03)
UA: UC IF INDICATED,UAUC: ABNORMAL
UROBILINOGEN UR QL STRIP.AUTO: 0.2 EU/DL (ref 0.2–1)
WBC # BLD AUTO: 6.2 K/UL (ref 3.6–11)
WBC URNS QL MICRO: ABNORMAL /HPF (ref 0–4)

## 2018-01-15 PROCEDURE — 99284 EMERGENCY DEPT VISIT MOD MDM: CPT

## 2018-01-15 PROCEDURE — 87086 URINE CULTURE/COLONY COUNT: CPT | Performed by: EMERGENCY MEDICINE

## 2018-01-15 PROCEDURE — 85025 COMPLETE CBC W/AUTO DIFF WBC: CPT | Performed by: EMERGENCY MEDICINE

## 2018-01-15 PROCEDURE — 81001 URINALYSIS AUTO W/SCOPE: CPT | Performed by: EMERGENCY MEDICINE

## 2018-01-15 PROCEDURE — 80053 COMPREHEN METABOLIC PANEL: CPT | Performed by: EMERGENCY MEDICINE

## 2018-01-15 PROCEDURE — 36415 COLL VENOUS BLD VENIPUNCTURE: CPT | Performed by: EMERGENCY MEDICINE

## 2018-01-15 NOTE — PROGRESS NOTES
Pt is an 81 y/o [facility] American female who presented via EMS to the ED with a cc of being physically combative due to familial issues today. CM met with pt re: assessment. CM introduced self, role. Pt verbalized understanding. Pt verified demographic information on chart. Pt reports she lives with her  in a 2 story home. Pt reports she is \"doing fine\" and denies any problems at home at this time. CM called and spoke with pt's daughter, Virgen Cook ([phone]). CM introduced self, role. Daughter verbalized understanding. Daughter verbalized caregiver stress. Daughter is arranging for pt's  to attend SNF at [facility] and is trying to care for the pt. Daughter stated the pt was argumentative and physically combative today. Daughter stated she has applied for Medicaid for the pt. CM offered information re: Medicaid personal caregivers and LTC. Daughter stated she \"might have to put her somewhere. \" Daughter is unsure of the plan for discharge this evening stating that she does not want the pt to return to her home. CM offered that the pt will likely discharge to to consider different options for discharge. Daughter verbalized understanding. Daughter stated she has no further questions or needs at this time. CM to continue to offer support, discharge planning as needed.      Alban Orr, MSW  [address]  [phone]

## 2018-01-15 NOTE — ED PROVIDER NOTES
EMERGENCY DEPARTMENT HISTORY AND PHYSICAL EXAM      Date: 1/15/2018  Patient Name: Frida Sanchez    History of Presenting Illness     Chief Complaint   Patient presents with    Aggressive behavior     Pts daughter called 46 for combative behavior at home, hx of dementia       History Provided By: Patient and EMS    HPI: Frida Sanchez, 80 y.o. female with PMHx significant for HTN, stroke, arthritis, anemia, dementia, presents via EMS to the ED with cc of being physically combative due to familial issues today. EMS notes the pt has a h/o \"severe\" dementia. She has had issues with her son in law with prior multiple incidents and behavioral issues. The difference today was that the pt had gotten combative. EMS notes a h/o UTI's. Pt's vitals were otherwise stable. Pt denies any pain, change in appetite, nausea, vomiting, abdominal pain, changes in her bowel/bladder habits, or any leg swelling. Given the pt is not complaining of any symptoms, there is no applicable location, quality, duration, severity, timing, context, associated sxs, additional context, or modifying factors. PCP: Karina Mcclain MD    There are no other complaints, changes, or physical findings at this time. Current Outpatient Prescriptions   Medication Sig Dispense Refill    furosemide (LASIX) 40 mg tablet Take 1 Tab by mouth daily. 30 Tab 12    ascorbic acid, vitamin C, (VITAMIN C) 500 mg tablet Take 500 mg by mouth every evening.  memantine ER (NAMENDA XR) 14 mg capsule Take 14 mg by mouth daily.  latanoprost (XALATAN) 0.005 % ophthalmic solution Administer 1 Drop to both eyes daily.  clotrimazole (LOTRIMIN AF, CLOTRIMAZOLE,) 1 % topical cream Apply  to affected area two (2) times daily as needed for Skin Irritation (White spots).  docusate sodium (COLACE) 100 mg capsule Take 1 Cap by mouth two (2) times daily as needed for Constipation for up to 90 days.  60 Cap 2    furosemide (LASIX) 40 mg tablet Take 1 Tab by mouth daily. 30 Tab 1    potassium chloride SR (KLOR-CON 10) 10 mEq tablet Take 1 Tab by mouth daily. 30 Tab 1    BRIMONIDINE TARTRATE/TIMOLOL (COMBIGAN OP) Administer 1 Drop to both eyes two (2) times a day.  aspirin 81 mg chewable tablet Take 40.5 mg by mouth every evening. Indications: pt takes 1/2 tablet      cyanocobalamin (VITAMIN B-12) 1,000 mcg tablet Take 1,000 mcg by mouth daily.  diclofenac (VOLTAREN) 1 % topical gel Apply 4 g to affected area three (3) times daily as needed for Pain.  GLUCOSAMINE HCL/CHONDR JONES A NA (GLUCOSAMINE-CHONDROITIN) 750-600 mg tab Take 1 Tab by mouth daily.  acetaminophen (TYLENOL) 325 mg tablet Take 325 mg by mouth every six (6) hours as needed for Pain.  ascorbic acid (VITAMIN C) 250 mg tablet Take 500 mg by mouth daily.  FERROUS SULFATE Take 1 Tab by mouth daily. Past History     Past Medical History:  Past Medical History:   Diagnosis Date    Anemia     Arthritis     Chronic pain     all over due to arthritis/knees/back/hand    DEMENTIA     Glaucoma     Hypertension     Other ill-defined conditions(799.89)     hiatal hernia    Other ill-defined conditions(799.89)     glaucoma    Other ill-defined conditions(799.89)     diverticulosis    Other ill-defined conditions(799.89)     2 cysts in arms    Other ill-defined conditions(799.89)     intestional blockage    Psychiatric disorder     dementia/anxiety    Stroke [facility])     mini stroke       Past Surgical History:  Past Surgical History:   Procedure Laterality Date    HX GYN      surgery to removed  fetus (abdominal incision)    HX LAP CHOLECYSTECTOMY      HX ORTHOPAEDIC      cyst removed from right arm - benign       Family History:  No family history on file.     Social History:  Social History   Substance Use Topics    Smoking status: Former Smoker    Smokeless tobacco: Never Used      Comment: former cigarette smoker - 15 yrs ago    Alcohol use No       Allergies:  No Known Allergies      Review of Systems   Review of Systems   Constitutional: Negative for appetite change, fatigue and fever. HENT: Negative. Eyes: Negative. Respiratory: Negative for shortness of breath and wheezing. Cardiovascular: Negative for chest pain and leg swelling. Gastrointestinal: Negative for abdominal pain, blood in stool, constipation, diarrhea, nausea and vomiting. Endocrine: Negative. Genitourinary: Negative for difficulty urinating and dysuria. Musculoskeletal: Negative. Negative for myalgias. Skin: Negative for rash. Allergic/Immunologic: Negative. Neurological: Negative for weakness and numbness. Hematological: Negative. Psychiatric/Behavioral: Negative. Physical Exam   Physical Exam   Constitutional: She is oriented to person, place, and time. She appears well-developed and well-nourished. No distress. HENT:   Head: Normocephalic and atraumatic. Mouth/Throat: Oropharynx is clear and moist.   Eyes: Conjunctivae and EOM are normal.   Neck: Neck supple. No JVD present. No tracheal deviation present. Cardiovascular: Normal rate, regular rhythm and intact distal pulses. Exam reveals no gallop and no friction rub. No murmur heard. Pulmonary/Chest: Effort normal and breath sounds normal. No stridor. No respiratory distress. She has no wheezes. Abdominal: Soft. Bowel sounds are normal. She exhibits no distension and no mass. There is no tenderness. There is no guarding. Musculoskeletal: Normal range of motion. She exhibits no edema or tenderness. No deformity   Neurological: She is alert and oriented to person, place, and time. She has normal strength. No focal deficits   Skin: Skin is warm, dry and intact. No rash noted. Psychiatric: She has a normal mood and affect. Her behavior is normal. Judgment and thought content normal.   Nursing note and vitals reviewed.       Diagnostic Study Results     Labs -  Recent Results (from the past 12 hour(s))   URINALYSIS W/ REFLEX CULTURE    Collection Time: 01/15/18  5:45 PM   Result Value Ref Range    Color YELLOW/STRAW      Appearance CLEAR CLEAR      Specific gravity 1.018 1.003 - 1.030      pH (UA) 6.0 5.0 - 8.0      Protein NEGATIVE  NEG mg/dL    Glucose NEGATIVE  NEG mg/dL    Ketone NEGATIVE  NEG mg/dL    Bilirubin NEGATIVE  NEG      Blood NEGATIVE  NEG      Urobilinogen 0.2 0.2 - 1.0 EU/dL    Nitrites NEGATIVE  NEG      Leukocyte Esterase NEGATIVE  NEG      WBC 0-4 0 - 4 /hpf    RBC 5-10 0 - 5 /hpf    Epithelial cells FEW FEW /lpf    Bacteria 1+ (A) NEG /hpf    UA:UC IF INDICATED URINE CULTURE ORDERED (A) CNI     METABOLIC PANEL, COMPREHENSIVE    Collection Time: 01/15/18  6:24 PM   Result Value Ref Range    Sodium 141 136 - 145 mmol/L    Potassium 4.2 3.5 - 5.1 mmol/L    Chloride 104 97 - 108 mmol/L    CO2 33 (H) 21 - 32 mmol/L    Anion gap 4 (L) 5 - 15 mmol/L    Glucose 101 (H) 65 - 100 mg/dL    BUN 18 6 - 20 MG/DL    Creatinine 1.15 (H) 0.55 - 1.02 MG/DL    BUN/Creatinine ratio 16 12 - 20      GFR est AA 54 (L) >60 ml/min/1.73m2    GFR est non-AA 45 (L) >60 ml/min/1.73m2    Calcium 8.8 8.5 - 10.1 MG/DL    Bilirubin, total 0.3 0.2 - 1.0 MG/DL    ALT (SGPT) 18 12 - 78 U/L    AST (SGOT) 17 15 - 37 U/L    Alk. phosphatase 66 45 - 117 U/L    Protein, total 6.9 6.4 - 8.2 g/dL    Albumin 3.3 (L) 3.5 - 5.0 g/dL    Globulin 3.6 2.0 - 4.0 g/dL    A-G Ratio 0.9 (L) 1.1 - 2.2     CBC WITH AUTOMATED DIFF    Collection Time: 01/15/18  6:24 PM   Result Value Ref Range    WBC 6.2 3.6 - 11.0 K/uL    RBC 4.41 3.80 - 5.20 M/uL    HGB 12.0 11.5 - 16.0 g/dL    HCT 37.6 35.0 - 47.0 %    MCV 85.3 80.0 - 99.0 FL    MCH 27.2 26.0 - 34.0 PG    MCHC 31.9 30.0 - 36.5 g/dL    RDW 13.9 11.5 - 14.5 %    PLATELET 723 078 - 004 K/uL    NEUTROPHILS 49 32 - 75 %    LYMPHOCYTES 33 12 - 49 %    MONOCYTES 14 (H) 5 - 13 %    EOSINOPHILS 4 0 - 7 %    BASOPHILS 0 0 - 1 %    ABS. NEUTROPHILS 3.0 1.8 - 8.0 K/UL    ABS. LYMPHOCYTES 2.0 0.8 - 3.5 K/UL    ABS. MONOCYTES 0.9 0.0 - 1.0 K/UL    ABS. EOSINOPHILS 0.3 0.0 - 0.4 K/UL    ABS. BASOPHILS 0.0 0.0 - 0.1 K/UL       Medical Decision Making   I am the first provider for this patient. I reviewed the vital signs, available nursing notes, past medical history, past surgical history, family history and social history. Vital Signs-Reviewed the patient's vital signs. Patient Vitals for the past 12 hrs:   Temp Pulse Resp BP SpO2   01/15/18 1733 97.4 °F (36.3 °C) (!) 55 12 148/60 99 %     Records Reviewed: Nursing Notes and Old Medical Records    Provider Notes (Medical Decision Making):   Pt with a hx of dementia, has had aggressive outbursts in the past. EMS called for combative behavior, but patient has been calm and cooperative enroute and remains so here. Will check labs to eval for uti, electrolyte abnormality, dehydration. Will speak with daughter regarding discharge home. Case management notified. ED Course:   Initial assessment performed. The patients presenting problems have been discussed, and they are in agreement with the care plan formulated and outlined with them. I have encouraged them to ask questions as they arise throughout their visit. PROGRESS NOTE:  5:33 PM  Speaking to case management about the pt. PROGRESS NOTE:  6:52 PM  Case management had spoken to the pt's daughter. She noted the daughter was stressed out and the pt's  is here. She informed the family that the pt would likely be discharged and they do not know where to bring the pt afterwards. They do not want the pt to be discharged. Case management provided dementia education tips to the family. Disposition:  DISCHARGE NOTE  7:22 PM  The patient has been re-evaluated and is ready for discharge. Reviewed available results with patient. Counseled patient on diagnosis and care plan. Patient has expressed understanding, and all questions have been answered.  Patient agrees with plan and agrees to follow up as recommended, or return to the ED if their symptoms worsen. Discharge instructions have been provided and explained to the patient, along with reasons to return to the ED. PLAN:  1. Discharge  Follow-up Information     Follow up With Details Comments Contact Info    Nav Rocha MD Schedule an appointment as soon as possible for a visit  Rik Arango [phone]          Return to ED if worse     Diagnosis     Clinical Impression:   1. Dementia with aggressive behavior        Attestations: This note is prepared by June Freeman, acting as Scribe for Michael Tyler DO. Michael Tyler DO: The scribe's documentation has been prepared under my direction and personally reviewed by me in its entirety. I confirm that the note above accurately reflects all work, treatment, procedures, and medical decision making performed by me.

## 2018-01-16 NOTE — DISCHARGE INSTRUCTIONS
Dementia: Care Instructions  Your Care Instructions    Dementia is a loss of mental skills that affects your daily life. It is different than the occasional trouble with memory that is part of aging. You may find it hard to remember things that you feel you should be able to remember. Or you may feel that your mind is just not working as well as usual.  Finding out that you have dementia is a shock. You may be afraid and worried about how the condition will change your life. Although there is no cure at this time, medicine may slow memory loss and improve thinking for a while. Other medicines may be able to help you sleep or cope with depression and behavior changes. Dementia often gets worse slowly. But it can get worse quickly. As dementia gets worse, it may become harder to do common things that take planning, like making a list and going shopping. Over time, the disease may make it hard for you to take care of yourself. Some people with dementia need others to help care for them. Dementia is different for everyone. You may be able to function well for a long time. In the early stage of the condition, you can do things at home to make life easier and safer. You also can keep doing your hobbies and other activities. Many people find comfort in planning now for their future needs. Follow-up care is a key part of your treatment and safety. Be sure to make and go to all appointments, and call your doctor if you are having problems. It's also a good idea to know your test results and keep a list of the medicines you take. How can you care for yourself at home? · Take your medicines exactly as prescribed. Call your doctor if you think you are having a problem with your medicine. · Eat healthy foods. Eat lots of whole grains, fruits, and vegetables every day.  If you are not hungry, try snacks or nutritional drinks such as Boost, Ensure, or Sustacal.  · If you have problems sleeping:  ¨ Try not to nap too close to your bedtime. ¨ Exercise regularly. Walking is a good choice. ¨ Try a glass of warm milk or caffeine-free herbal tea before bed. · Do tasks and activities during the time of day when you feel your best. It may help to develop a daily routine. · Post labels, lists, and sticky notes to help you remember things. Write your activities on a calendar you can easily find. Put your clock where you can easily see it. · Stay active. Take walks in familiar places, or with friends or loved ones. Try to stay active mentally too. Read and work crossword puzzles if you enjoy these activities. · Do not drive unless you can pass an on-road driving test. If you are not sure if you are safe to drive, your state 's license bureau can test you. · Keep a cordless phone and a flashlight with new batteries by your bed. If possible, put a phone in each of the main rooms of your house, or carry a cell phone in case you fall and cannot reach a phone. Or, you can wear a device around your neck or wrist. You push a button that sends a signal for help. Acknowledge your emotions and plan for the future  · Talk openly and honestly with your doctor. · Let yourself grieve. It is common to feel angry, scared, frustrated, anxious, or depressed. · Get emotional support from family, friends, a support group, or a counselor experienced in working with people who have dementia. · Ask for help if you need it. · Plan for the future. ¨ Talk to your family and doctor about preparing a living will and other important papers while you can make decisions. These papers tell your doctors how to care for you at the end of your life. ¨ Consider naming a person to make decisions about your care if you are not able to. When should you call for help? Call 911 anytime you think you may need emergency care. For example, call if:  ? · You are lost and do not know whom to call. ? · You are injured and do not know whom to call.    ?Call your doctor now or seek immediate medical care if:  ? · You are more confused or upset than usual.   ? · You feel like you could hurt yourself because your mind is not working well. ? Watch closely for changes in your health, and be sure to contact your doctor if you have any problems. Where can you learn more? Go to http://césar-radha.info/. Enter G343 in the search box to learn more about \"Dementia: Care Instructions. \"  Current as of: May 12, 2017  Content Version: 11.4  © 5944-1928 [employer]. Care instructions adapted under license by [employer] (which disclaims liability or warranty for this information). If you have questions about a medical condition or this instruction, always ask your healthcare professional. [address] any warranty or liability for your use of this information.

## 2018-01-16 NOTE — ED NOTES
[facility] [address] [place] [address]. Pt resting quietly and very pleasant. Pt in no acute distress. VSS. Call bell within reach.

## 2018-01-16 NOTE — ED NOTES
Provider at bedside for dispo and follow up. Discharge plan reviewed and paperwork signed, pain level within manageable comfortable limits, wheeled to exit, gait steady, safety maintained.

## 2018-01-16 NOTE — ED NOTES
Attempted to call daughter at phone number listed. Phone number was for a doctor's office. Unable to leave message. This RN also attempted to call home number listed in chart. This phone number is not in service.

## 2018-01-17 LAB
BACTERIA SPEC CULT: NORMAL
CC UR VC: NORMAL
SERVICE CMNT-IMP: NORMAL

## 2018-01-23 ENCOUNTER — PATIENT OUTREACH (OUTPATIENT)
Dept: CARDIOLOGY CLINIC | Age: 83
End: 2018-01-23

## 2018-01-23 NOTE — PROGRESS NOTES
Called pt's daughter Allyssa Vivar who states that pt is currently at home and Allyssa Vivar is the caregiver to pt and pt's . Allyssa Vivar states that she works 6 days a week and then cares for parents when she returns home. Pt provided with the telephone number for [employer]. We discussed that this service may be able to help pt's daughter find resources for the pt and pt's  as well as her self. I provided pt's daughter with my direct office telephone. Will continue to follow. Pt's daughter reports that pt has seen Dr. Donovan, as do all family members and reports that pt had an office visit with him the week before [place]. Pt states that pt's PCP had stated that pt might be a candidate for hospice but pt's daughter states that she is not ready for hospice.

## 2018-02-12 ENCOUNTER — PATIENT OUTREACH (OUTPATIENT)
Dept: CASE MANAGEMENT | Age: 83
End: 2018-02-12

## 2018-03-08 ENCOUNTER — PATIENT OUTREACH (OUTPATIENT)
Dept: CARDIOLOGY CLINIC | Age: 83
End: 2018-03-08

## 2018-03-08 NOTE — PROGRESS NOTES
HF 90 day bundle is complete with one ED visit. Pt has not been admitted to any other facility to this NN knowledge.

## 2018-06-19 ENCOUNTER — HOSPITAL ENCOUNTER (OUTPATIENT)
Dept: MAMMOGRAPHY | Age: 83
Discharge: HOME | End: 2018-06-19
Attending: FAMILY MEDICINE
Payer: MEDICARE

## 2018-06-19 DIAGNOSIS — Z12.31 VISIT FOR SCREENING MAMMOGRAM: ICD-10-CM

## 2018-06-19 PROCEDURE — 77067 SCR MAMMO BI INCL CAD: CPT

## 2018-12-02 ENCOUNTER — APPOINTMENT (OUTPATIENT)
Dept: GENERAL RADIOLOGY | Age: 83
End: 2018-12-02
Attending: EMERGENCY MEDICINE
Payer: MEDICARE

## 2018-12-02 ENCOUNTER — APPOINTMENT (OUTPATIENT)
Dept: CT IMAGING | Age: 83
End: 2018-12-02
Attending: EMERGENCY MEDICINE
Payer: MEDICARE

## 2018-12-02 ENCOUNTER — HOSPITAL ENCOUNTER (EMERGENCY)
Age: 83
Discharge: HOME | End: 2018-12-02
Attending: EMERGENCY MEDICINE
Payer: MEDICARE

## 2018-12-02 VITALS
HEART RATE: 63 BPM | WEIGHT: 140 LBS | BODY MASS INDEX: 23.32 KG/M2 | SYSTOLIC BLOOD PRESSURE: 137 MMHG | OXYGEN SATURATION: 93 % | RESPIRATION RATE: 16 BRPM | HEIGHT: 65 IN | TEMPERATURE: 97.5 F | DIASTOLIC BLOOD PRESSURE: 83 MMHG

## 2018-12-02 DIAGNOSIS — R10.9 ACUTE ABDOMINAL PAIN: ICD-10-CM

## 2018-12-02 DIAGNOSIS — K59.00 CONSTIPATION, UNSPECIFIED CONSTIPATION TYPE: Primary | ICD-10-CM

## 2018-12-02 DIAGNOSIS — I10 ACCELERATED HYPERTENSION: ICD-10-CM

## 2018-12-02 DIAGNOSIS — R06.02 SOB (SHORTNESS OF BREATH): ICD-10-CM

## 2018-12-02 LAB
ALBUMIN SERPL-MCNC: 3.6 G/DL (ref 3.5–5)
ALBUMIN/GLOB SERPL: 0.9 {RATIO} (ref 1.1–2.2)
ALP SERPL-CCNC: 80 U/L (ref 45–117)
ALT SERPL-CCNC: 23 U/L (ref 12–78)
ANION GAP SERPL CALC-SCNC: 5 MMOL/L (ref 5–15)
AST SERPL-CCNC: 32 U/L (ref 15–37)
BASOPHILS # BLD: 0 K/UL (ref 0–0.1)
BASOPHILS NFR BLD: 1 % (ref 0–1)
BILIRUB SERPL-MCNC: 0.4 MG/DL (ref 0.2–1)
BNP SERPL-MCNC: 974 PG/ML (ref 0–450)
BUN SERPL-MCNC: 20 MG/DL (ref 6–20)
BUN/CREAT SERPL: 19 (ref 12–20)
CALCIUM SERPL-MCNC: 9.9 MG/DL (ref 8.5–10.1)
CHLORIDE SERPL-SCNC: 106 MMOL/L (ref 97–108)
CO2 SERPL-SCNC: 28 MMOL/L (ref 21–32)
CREAT SERPL-MCNC: 1.03 MG/DL (ref 0.55–1.02)
DIFFERENTIAL METHOD BLD: NORMAL
EOSINOPHIL # BLD: 0.2 K/UL (ref 0–0.4)
EOSINOPHIL NFR BLD: 3 % (ref 0–7)
ERYTHROCYTE [DISTWIDTH] IN BLOOD BY AUTOMATED COUNT: 13.9 % (ref 11.5–14.5)
GLOBULIN SER CALC-MCNC: 4.1 G/DL (ref 2–4)
GLUCOSE SERPL-MCNC: 90 MG/DL (ref 65–100)
HCT VFR BLD AUTO: 41.2 % (ref 35–47)
HGB BLD-MCNC: 13.1 G/DL (ref 11.5–16)
IMM GRANULOCYTES # BLD: 0 K/UL (ref 0–0.04)
IMM GRANULOCYTES NFR BLD AUTO: 0 % (ref 0–0.5)
LIPASE SERPL-CCNC: 127 U/L (ref 73–393)
LYMPHOCYTES # BLD: 3 K/UL (ref 0.8–3.5)
LYMPHOCYTES NFR BLD: 45 % (ref 12–49)
MCH RBC QN AUTO: 28.1 PG (ref 26–34)
MCHC RBC AUTO-ENTMCNC: 31.8 G/DL (ref 30–36.5)
MCV RBC AUTO: 88.4 FL (ref 80–99)
MONOCYTES # BLD: 0.6 K/UL (ref 0–1)
MONOCYTES NFR BLD: 9 % (ref 5–13)
NEUTS SEG # BLD: 2.8 K/UL (ref 1.8–8)
NEUTS SEG NFR BLD: 41 % (ref 32–75)
NRBC # BLD: 0 K/UL (ref 0–0.01)
NRBC BLD-RTO: 0 PER 100 WBC
PLATELET # BLD AUTO: 270 K/UL (ref 150–400)
PMV BLD AUTO: 11 FL (ref 8.9–12.9)
POTASSIUM SERPL-SCNC: 5 MMOL/L (ref 3.5–5.1)
PROT SERPL-MCNC: 7.7 G/DL (ref 6.4–8.2)
RBC # BLD AUTO: 4.66 M/UL (ref 3.8–5.2)
SODIUM SERPL-SCNC: 139 MMOL/L (ref 136–145)
TROPONIN I SERPL-MCNC: <0.05 NG/ML
WBC # BLD AUTO: 6.8 K/UL (ref 3.6–11)

## 2018-12-02 PROCEDURE — 84484 ASSAY OF TROPONIN QUANT: CPT

## 2018-12-02 PROCEDURE — 83690 ASSAY OF LIPASE: CPT

## 2018-12-02 PROCEDURE — 99284 EMERGENCY DEPT VISIT MOD MDM: CPT

## 2018-12-02 PROCEDURE — 83880 ASSAY OF NATRIURETIC PEPTIDE: CPT

## 2018-12-02 PROCEDURE — 71045 X-RAY EXAM CHEST 1 VIEW: CPT

## 2018-12-02 PROCEDURE — 74176 CT ABD & PELVIS W/O CONTRAST: CPT

## 2018-12-02 PROCEDURE — 74018 RADEX ABDOMEN 1 VIEW: CPT

## 2018-12-02 PROCEDURE — 80053 COMPREHEN METABOLIC PANEL: CPT

## 2018-12-02 PROCEDURE — 93005 ELECTROCARDIOGRAM TRACING: CPT

## 2018-12-02 PROCEDURE — 85025 COMPLETE CBC W/AUTO DIFF WBC: CPT

## 2018-12-02 PROCEDURE — 36415 COLL VENOUS BLD VENIPUNCTURE: CPT

## 2018-12-02 RX ORDER — POLYETHYLENE GLYCOL 3350 17 G/17G
17 POWDER, FOR SOLUTION ORAL DAILY
Qty: 289 G | Refills: 0 | Status: SHIPPED | OUTPATIENT
Start: 2018-12-02

## 2018-12-02 RX ORDER — DICYCLOMINE HYDROCHLORIDE 10 MG/ML
20 INJECTION INTRAMUSCULAR
Status: DISCONTINUED | OUTPATIENT
Start: 2018-12-02 | End: 2018-12-02

## 2018-12-02 RX ORDER — KETOROLAC TROMETHAMINE 30 MG/ML
15 INJECTION, SOLUTION INTRAMUSCULAR; INTRAVENOUS
Status: DISCONTINUED | OUTPATIENT
Start: 2018-12-02 | End: 2018-12-02

## 2018-12-02 RX ORDER — DICYCLOMINE HYDROCHLORIDE 10 MG/1
10 CAPSULE ORAL
Qty: 20 CAP | Refills: 0 | Status: SHIPPED | OUTPATIENT
Start: 2018-12-02 | End: 2018-12-07

## 2018-12-02 RX ORDER — KETOROLAC TROMETHAMINE 30 MG/ML
15 INJECTION, SOLUTION INTRAMUSCULAR; INTRAVENOUS
Status: DISCONTINUED | OUTPATIENT
Start: 2018-12-02 | End: 2018-12-02 | Stop reason: HOSPADM

## 2018-12-02 RX ORDER — DICYCLOMINE HYDROCHLORIDE 10 MG/ML
20 INJECTION INTRAMUSCULAR
Status: DISCONTINUED | OUTPATIENT
Start: 2018-12-02 | End: 2018-12-02 | Stop reason: HOSPADM

## 2018-12-02 RX ORDER — SODIUM CHLORIDE 9 MG/ML
50 INJECTION, SOLUTION INTRAVENOUS
Status: DISCONTINUED | OUTPATIENT
Start: 2018-12-02 | End: 2018-12-02

## 2018-12-02 RX ORDER — SODIUM CHLORIDE 0.9 % (FLUSH) 0.9 %
10 SYRINGE (ML) INJECTION
Status: DISCONTINUED | OUTPATIENT
Start: 2018-12-02 | End: 2018-12-02

## 2018-12-02 RX ORDER — ALBUTEROL SULFATE 90 UG/1
2 AEROSOL, METERED RESPIRATORY (INHALATION) ONCE
Status: DISCONTINUED | OUTPATIENT
Start: 2018-12-02 | End: 2018-12-02

## 2018-12-02 RX ORDER — FACIAL-BODY WIPES
10 EACH TOPICAL DAILY
Qty: 5 SUPPOSITORY | Refills: 0 | Status: SHIPPED | OUTPATIENT
Start: 2018-12-02

## 2018-12-02 NOTE — DISCHARGE INSTRUCTIONS
Thank you for allowing us to take care of you today! We hope we addressed all of your concerns and needs. We strive to provide excellent quality care in the Emergency Department. You will receive a survey after your visit to evaluate the care you were provided. Should you receive a survey from us, we invite you to share your experience and tell us what made it excellent. It was a pleasure serving you, we invite you to share your experience with us, in our pursuit for excellence, should you be selected to receive a survey. The exam and treatment you received in the Emergency Department were for an urgent problem and are not intended as complete care. It is important that you follow up with a doctor, nurse practitioner, or physician assistant for ongoing care. If your symptoms become worse or you do not improve as expected and you are unable to reach your usual health care provider, you should return to the Emergency Department. We are available 24 hours a day. Please take your discharge instructions with you when you go to your follow-up appointment. If you have any problem arranging a follow-up appointment, contact the Emergency Department immediately. If a prescription has been provided, please have it filled as soon as possible to prevent a delay in treatment. Read the entire medication instruction sheet provided to you by the pharmacy. If you have any questions or reservations about taking the medication due to side effects or interactions with other medications, please call your primary care physician or contact the ER to speak with the charge nurse. Make an appointment with your family doctor or the physician you were referred to for follow-up of this visit as instructed on your discharge paperwork, as this is mandatory follow-up. Return to the ER if you are unable to be seen or if you are unable to be seen in a timely manner.     If you have any problem arranging the follow-up visit, contact the Emergency Department immediately. I hope you feel better and thank you again for allow us to provide you with excellent care today at [facility]! Warmest regards,    Ivette Hernandez MD  Emergency Medicine Physician  [facility]           Constipation: Care Instructions  Your Care Instructions    Constipation means that you have a hard time passing stools (bowel movements). People pass stools from 3 times a day to once every 3 days. What is normal for you may be different. Constipation may occur with pain in the rectum and cramping. The pain may get worse when you try to pass stools. Sometimes there are small amounts of bright red blood on toilet paper or the surface of stools. This is because of enlarged veins near the rectum (hemorrhoids). A few changes in your diet and lifestyle may help you avoid ongoing constipation. Your doctor may also prescribe medicine to help loosen your stool. Some medicines can cause constipation. These include pain medicines and antidepressants. Tell your doctor about all the medicines you take. Your doctor may want to make a medicine change to ease your symptoms. Follow-up care is a key part of your treatment and safety. Be sure to make and go to all appointments, and call your doctor if you are having problems. It's also a good idea to know your test results and keep a list of the medicines you take. How can you care for yourself at home? · Drink plenty of fluids, enough so that your urine is light yellow or clear like water. If you have kidney, heart, or liver disease and have to limit fluids, talk with your doctor before you increase the amount of fluids you drink. · Include high-fiber foods in your diet each day. These include fruits, vegetables, beans, and whole grains. · Get at least 30 minutes of exercise on most days of the week. Walking is a good choice.  You also may want to do other activities, such as running, swimming, cycling, or playing tennis or team sports. · Take a fiber supplement, such as Citrucel or Metamucil, every day. Read and follow all instructions on the label. · Schedule time each day for a bowel movement. A daily routine may help. Take your time having your bowel movement. · Support your feet with a small step stool when you sit on the toilet. This helps flex your hips and places your pelvis in a squatting position. · Your doctor may recommend an over-the-counter laxative to relieve your constipation. Examples are Milk of Magnesia and MiraLax. Read and follow all instructions on the label. Do not use laxatives on a long-term basis. When should you call for help? Call your doctor now or seek immediate medical care if:    · You have new or worse belly pain.     · You have new or worse nausea or vomiting.     · You have blood in your stools.    Watch closely for changes in your health, and be sure to contact your doctor if:    · Your constipation is getting worse.     · You do not get better as expected. Where can you learn more? Go to http://césar-radha.info/. Enter 21  in the search box to learn more about \"Constipation: Care Instructions. \"  Current as of: November 20, 2017  Content Version: 11.8  © 8252-0490 [employer]. Care instructions adapted under license by [employer] (which disclaims liability or warranty for this information). If you have questions about a medical condition or this instruction, always ask your healthcare professional. [place] [address] any warranty or liability for your use of this information. Abdominal Pain: Care Instructions  Your Care Instructions    Abdominal pain has many possible causes. Some aren't serious and get better on their own in a few days. Others need more testing and treatment.  If your pain continues or gets worse, you need to be rechecked and may need more tests to find out what is wrong. You may need surgery to correct the problem. Don't ignore new symptoms, such as fever, nausea and vomiting, urination problems, pain that gets worse, and dizziness. These may be signs of a more serious problem. Your doctor may have recommended a follow-up visit in the next 8 to 12 hours. If you are not getting better, you may need more tests or treatment. The doctor has checked you carefully, but problems can develop later. If you notice any problems or new symptoms, get medical treatment right away. Follow-up care is a key part of your treatment and safety. Be sure to make and go to all appointments, and call your doctor if you are having problems. It's also a good idea to know your test results and keep a list of the medicines you take. How can you care for yourself at home? · Rest until you feel better. · To prevent dehydration, drink plenty of fluids, enough so that your urine is light yellow or clear like water. Choose water and other caffeine-free clear liquids until you feel better. If you have kidney, heart, or liver disease and have to limit fluids, talk with your doctor before you increase the amount of fluids you drink. · If your stomach is upset, eat mild foods, such as rice, dry toast or crackers, bananas, and applesauce. Try eating several small meals instead of two or three large ones. · Wait until 48 hours after all symptoms have gone away before you have spicy foods, alcohol, and drinks that contain caffeine. · Do not eat foods that are high in fat. · Avoid anti-inflammatory medicines such as aspirin, ibuprofen (Advil, Motrin), and naproxen (Aleve). These can cause stomach upset. Talk to your doctor if you take daily aspirin for another health problem. When should you call for help? Call 911 anytime you think you may need emergency care. For example, call if:    · You passed out (lost consciousness).     · You pass maroon or very bloody stools.   · You vomit blood or what looks like coffee grounds.     · You have new, severe belly pain.    Call your doctor now or seek immediate medical care if:    · Your pain gets worse, especially if it becomes focused in one area of your belly.     · You have a new or higher fever.     · Your stools are black and look like tar, or they have streaks of blood.     · You have unexpected vaginal bleeding.     · You have symptoms of a urinary tract infection. These may include:  ? Pain when you urinate. ? Urinating more often than usual.  ? Blood in your urine.     · You are dizzy or lightheaded, or you feel like you may faint.    Watch closely for changes in your health, and be sure to contact your doctor if:    · You are not getting better after 1 day (24 hours). Where can you learn more? Go to http://cséar-radha.info/. Enter T875 in the search box to learn more about \"Abdominal Pain: Care Instructions. \"  Current as of: November 20, 2017  Content Version: 11.8  © 2831-2989 [employer]. Care instructions adapted under license by [employer] (which disclaims liability or warranty for this information). If you have questions about a medical condition or this instruction, always ask your healthcare professional. [address] any warranty or liability for your use of this information.

## 2018-12-02 NOTE — ED PROVIDER NOTES
EMERGENCY DEPARTMENT HISTORY AND PHYSICAL EXAM 
 
 
Date: 12/2/2018 Patient Name: Lynnette Melton History of Presenting Illness Chief Complaint Patient presents with  Abdominal Pain  
  EMS states ABD pain for 2 weeks  Shortness of Breath History Provided By: Patient, Patient's Daughter and EMS 
 
HPI: Lynnette Melton, 80 y.o. female with PMHx significant for HTN, stroke, CHF, arthritis, dementia, presents via EMS to the ED with cc of SOB since this morning. Daughter denies any associated symptoms. She states she had visited the pt twice this morning and the pt was fine during the first visit. Then, during the second visit, the daughter had found the pt leaning towards the left side and states she normally does this when she is hurting. The daughter had questioned the pt and the pt noted she was sob. Daughter states the pt has a h/o CHF (followed by Dr. Winnie Gill) but does not have any leg swelling today. She is rx'd a diuretic and has been taking it as rx'd. Pt denies any SOB while sitting in her stretcher currently. She also reports an intermittent, lower abdominal pain x 2 weeks. When asked if the pt is constipated, the daughter reports \"sometimes. \" She also states Miguel Angel Flores has a habit of holding her water a lot. \" Daughter denies the pt receiving her influenza vaccination this year. Additionally, pt specifically denies any recent fever, chills, headache, nausea, vomiting, diarrhea, CP, lightheadedness, dizziness, numbness, weakness, tingling, BLE swelling, heart palpitations, urinary sxs, changes in BM, changes in PO intake, melena, hematochezia, cough, or congestion. PCP: Priyank Corado MD 
 
PMHx: Significant for HTN, CHF, stroke, dementia PSHx: Significant for cholecystectomy Social Hx: -tobacco (former), -EtOH, -Illicit Drugs There are no other complaints, changes or physical findings at this time. Past History Past Medical History: 
Past Medical History: Diagnosis Date  Anemia  Arthritis  Chronic pain   
 all over due to arthritis/knees/back/hand  Dementia  Glaucoma  Hypertension  Other ill-defined conditions(799.89)   
 hiatal hernia  Other ill-defined conditions(799.89)   
 glaucoma  Other ill-defined conditions(799.89) diverticulosis  Other ill-defined conditions(799.89) 2 cysts in arms  Other ill-defined conditions(799.89)   
 intestional blockage  Psychiatric disorder   
 dementia/anxiety  Stroke [facility])   
 mini stroke Past Surgical History: 
Past Surgical History:  
Procedure Laterality Date  HX GYN    
 surgery to removed  fetus (abdominal incision)  HX LAP CHOLECYSTECTOMY  HX ORTHOPAEDIC    
 cyst removed from right arm - benign Family History: No family history on file. Social History: 
Social History Tobacco Use  Smoking status: Former Smoker  Smokeless tobacco: Never Used  Tobacco comment: former cigarette smoker - 15 yrs ago Substance Use Topics  Alcohol use: No  
 Drug use: Not on file Allergies: 
No Known Allergies Review of Systems Review of Systems Constitutional: Negative. Negative for chills and fever. HENT: Negative for congestion, facial swelling, rhinorrhea, sore throat, trouble swallowing and voice change. Eyes: Negative. Respiratory: Positive for shortness of breath. Negative for apnea, cough, chest tightness and wheezing. Cardiovascular: Negative. Negative for chest pain, palpitations and leg swelling. Gastrointestinal: Positive for abdominal pain. Negative for abdominal distention, blood in stool, constipation, diarrhea, nausea and vomiting. Endocrine: Negative. Negative for cold intolerance, heat intolerance and polyuria. Genitourinary: Negative. Negative for difficulty urinating, dysuria, flank pain, frequency, hematuria and urgency. Musculoskeletal: Negative. Negative for arthralgias, back pain, myalgias, neck pain and neck stiffness. Skin: Negative. Negative for color change and rash. Neurological: Negative. Negative for dizziness, syncope, facial asymmetry, speech difficulty, weakness, light-headedness, numbness and headaches. Hematological: Negative. Does not bruise/bleed easily. Psychiatric/Behavioral: Negative. Negative for confusion and self-injury. The patient is not nervous/anxious. Physical Exam  
Physical Exam  
Constitutional: She is oriented to person, place, and time. She appears well-developed and well-nourished. No distress. HENT:  
Head: Normocephalic and atraumatic. Mouth/Throat: Oropharynx is clear and moist. No oropharyngeal exudate. Eyes: Conjunctivae and EOM are normal. Pupils are equal, round, and reactive to light. Neck: Normal range of motion. Cardiovascular: Normal rate, regular rhythm and normal heart sounds. Exam reveals no gallop and no friction rub. No murmur heard. Pulmonary/Chest: Effort normal and breath sounds normal. No respiratory distress. She has no wheezes. She has no rales. She exhibits no tenderness. Abdominal: Soft. Bowel sounds are normal. She exhibits no distension and no mass. There is no tenderness. There is no rebound and no guarding. Musculoskeletal: Normal range of motion. She exhibits no edema, tenderness or deformity. Neurological: She is alert and oriented to person, place, and time. She displays normal reflexes. No cranial nerve deficit. She exhibits normal muscle tone. Coordination normal.  
Skin: Skin is warm. No rash noted. She is not diaphoretic. Psychiatric: She has a normal mood and affect. Nursing note and vitals reviewed. Diagnostic Study Results Labs - No results found for this or any previous visit (from the past 12 hour(s)). Radiologic Studies -  
XR ABD (KUB) Final Result Initial Result:  
Impression:  impression: Fecal stasis . Narrative:  
 Clinical indication: Pain. AP portable supine view of the abdomen is obtained. Limited examination, patient 
cooperation. Limited. Prior cholecystectomy, fecal stasis. Nonspecific 
otherwise. CT Results  (Last 48 hours) 12/02/18 1416  CT ABD PELV WO CONT Final result Impression:  IMPRESSION: No bowel obstruction, ileus or perforation. No intra-abdominal  
abscess. Moderate amount of stool in the colon. Narrative:  INDICATION: Acute abdominal pain. Shortness of breath. Exam: Noncontrast CT of the abdomen and pelvis is performed with 5 mm  
collimation. Sagittal and coronal reformatted images were also performed. CT dose reduction was achieved through the use of a standardized protocol  
tailored for this examination and automatic exposure control for dose  
modulation. Direct comparison is made to prior CT dated June 2017. FINDINGS:  
   
The visualized lung bases are clear. Abdomen:   
   
Liver: The liver is normal on noncontrast images. Spleen: The spleen is normal on noncontrast images. Adrenals: The adrenals are normal on noncontrast images. Pancreas: The pancreas is normal on noncontrast images. Gallbladder: The gallbladder is surgically absent. Kidneys: There is no perinephric stranding, hydronephrosis or hydroureter. No  
renal, ureteral bladder calculus is visualized. Bowel: No thickened or dilated loop of large or small bowel seen. A moderate  
amount of stool is noted throughout the colon. Pelvis: Urinary bladder is partially filled and grossly normal.  
   
Bones: The osseous structures are diffusely demineralized. No acute fracture or  
subluxation. Miscellaneous: There is no free intraperitoneal gas or fluid. There is no focal  
fluid collection to suggest abscess. CXR Results  (Last 48 hours) 12/02/18 1405  XR CHEST PORT Final result Impression:  IMPRESSION: No acute changes. Narrative:  Clinical indication: Shortness of breath. Portable AP upright view of the chest is obtained, comparison to 2017. The a  
heart size is normal. There is no acute infiltrate. Medical Decision Making I am the first provider for this patient. I reviewed the vital signs, available nursing notes, past medical history, past surgical history, family history and social history. Vital Signs-Reviewed the patient's vital signs. Patient Vitals for the past 24 hrs: 
 BP SpO2  
12/02/18 1515 137/83 93 % 12/02/18 1500 144/89 99 % 12/02/18 1445 145/89 98 % 12/02/18 1430 126/84 96 % Pulse Oximetry Analysis - 92% on RA Cardiac Monitor:  
Rate: 70 bpm 
Rhythm: Normal Sinus Rhythm ED EKG interpretation: 1344 Rhythm: normal sinus rhythm and left anterior fascicular block; and regular . Rate (approx.): 67; Axis: normal; P wave: normal; QRS interval: normal ; ST/T wave: normal. This EKG was interpreted by Keshia Lee M.D. Records Reviewed: Nursing Notes, Old Medical Records, Previous electrocardiograms, Previous Radiology Studies and Previous Laboratory Studies Provider Notes (Medical Decision Making):  
Patient presents with acute dyspnea. DDx: asthma, copd, pna, pulmonary edema, acute bronchitis, ACS, ptx, pna. Will obtain EKG, labs, CXR, provide O2 as needed for hypoxia, treat symptomatically and reassess. Will continue to monitor closely in ED. ED Course:  
Initial assessment performed. The patients presenting problems have been discussed, and they are in agreement with the care plan formulated and outlined with them. I have encouraged them to ask questions as they arise throughout their visit. HYPERTENSION COUNSELING Education was provided to the patient today regarding their hypertension.  Patient is made aware of their elevated blood pressure and is instructed to follow up this week with their Primary Care for a recheck. Patient is counseled regarding consequences of chronic, uncontrolled hypertension including kidney disease, heart disease, stroke or even death. Patient states their understanding and agrees to follow up this week. Additionally, during their visit, I discussed sodium restriction, maintaining ideal body weight and regular exercise program as physiologic means to achieve blood pressure control. The patient will strive towards this. I reviewed our electronic medical record system for any past medical records that were available that may contribute to the patients current condition, the nursing notes and vital signs from today's visit. Dave Falcon MD 
Progress Note: 
3:01 PM 
Patient reports feeling better and symptoms have improved after ED treatment. Pt able to tolerate PO and ambulate per baseline. [facility]'s final labs and imaging have been reviewed with her. She has been counseled regarding her diagnosis. She verbally conveys understanding and agreement of the signs, symptoms, diagnosis, treatment and prognosis and additionally agrees to follow up as recommended with Dr. Teddy Clayton MD in 24 - 48 hours. She also agrees with the care-plan and conveys that all of her questions have been answered. I have also put together some discharge instructions for her that include: 1) educational information regarding their diagnosis, 2) how to care for their diagnosis at home, as well a 3) list of reasons why they would want to return to the ED prior to their follow-up appointment, should their condition change. Critical Care Time:  
0 Disposition: 
DISCHARGE NOTE 
3:10 PM 
The patient has been re-evaluated and is ready for discharge. Reviewed available results with patient. Counseled patient on diagnosis and care plan.  Patient has expressed understanding, and all questions have been answered. Patient agrees with plan and agrees to follow up as recommended, or return to the ED if their symptoms worsen. Discharge instructions have been provided and explained to the patient, along with reasons to return to the ED. PLAN: 
1. Discharge Discharge Medication List as of 12/2/2018  3:08 PM  
  
CONTINUE these medications which have NOT CHANGED Details  
!! furosemide (LASIX) 40 mg tablet Take 1 Tab by mouth daily. , No Print, Disp-30 Tab, R-12  
  
!! ascorbic acid, vitamin C, (VITAMIN C) 500 mg tablet Take 500 mg by mouth every evening., Historical Med  
  
memantine ER (NAMENDA XR) 14 mg capsule Take 14 mg by mouth daily. , Historical Med  
  
latanoprost (XALATAN) 0.005 % ophthalmic solution Administer 1 Drop to both eyes daily. , Historical Med  
  
clotrimazole (LOTRIMIN AF, CLOTRIMAZOLE,) 1 % topical cream Apply  to affected area two (2) times daily as needed for Skin Irritation (White spots). , Historical Med  
  
!! furosemide (LASIX) 40 mg tablet Take 1 Tab by mouth daily. , Normal, Disp-30 Tab, R-1  
  
potassium chloride SR (KLOR-CON 10) 10 mEq tablet Take 1 Tab by mouth daily. , Normal, Disp-30 Tab, R-1  
  
BRIMONIDINE TARTRATE/TIMOLOL (COMBIGAN OP) Administer 1 Drop to both eyes two (2) times a day., Historical Med  
  
aspirin 81 mg chewable tablet Take 40.5 mg by mouth every evening. Indications: pt takes 1/2 tablet, Historical Med  
  
cyanocobalamin (VITAMIN B-12) 1,000 mcg tablet Take 1,000 mcg by mouth daily. , Historical Med  
  
diclofenac (VOLTAREN) 1 % topical gel Apply 4 g to affected area three (3) times daily as needed for Pain., Historical Med GLUCOSAMINE HCL/CHONDR JONES A NA (GLUCOSAMINE-CHONDROITIN) 750-600 mg tab Take 1 Tab by mouth daily. , Historical Med  
  
acetaminophen (TYLENOL) 325 mg tablet Take 325 mg by mouth every six (6) hours as needed for Pain., Historical Med  
  
!! ascorbic acid (VITAMIN C) 250 mg tablet Take 500 mg by mouth daily. , Historical Med  
  
 FERROUS SULFATE Take 1 Tab by mouth daily. , Historical Med  
  
 !! - Potential duplicate medications found. Please discuss with provider. 2.  
Follow-up Information Follow up With Specialties Details Why Contact Info Naomie Aj MD [address] 
[address] [place] [id number] 
[phone] [facility] EMERGENCY DEPT Emergency Medicine  As needed, If symptoms worsen [facility] [address] 
[phone] Return to ED if worse Diagnosis Clinical Impression: 1. Constipation, unspecified constipation type 2. Acute abdominal pain 3. SOB (shortness of breath) 4. Accelerated hypertension Attestations This note is prepared by Oleg Puri, acting as Scribe for Donnell Rinne, MD Donnell Rinne, MD : The scribe's documentation has been prepared under my direction and personally reviewed by me in its entirety. I confirm that the note above accurately reflects all work, treatment, procedures, and medical decision making performed by me. 
 
: 
 
This note will not be viewable in [address] Darrell Burton

## 2018-12-03 LAB
ATRIAL RATE: 67 BPM
CALCULATED R AXIS, ECG10: -48 DEGREES
CALCULATED T AXIS, ECG11: 1 DEGREES
DIAGNOSIS, 93000: NORMAL
P-R INTERVAL, ECG05: 146 MS
Q-T INTERVAL, ECG07: 402 MS
QRS DURATION, ECG06: 86 MS
QTC CALCULATION (BEZET), ECG08: 424 MS
VENTRICULAR RATE, ECG03: 67 BPM

## 2019-07-23 ENCOUNTER — HOME HEALTH ADMISSION (OUTPATIENT)
Dept: HOME HEALTH SERVICES | Facility: HOME HEALTH | Age: 84
End: 2019-07-23
Payer: MEDICARE

## 2019-07-24 ENCOUNTER — HOME CARE VISIT (OUTPATIENT)
Dept: HOME HEALTH SERVICES | Facility: HOME HEALTH | Age: 84
End: 2019-07-24

## 2019-07-25 ENCOUNTER — HOME CARE VISIT (OUTPATIENT)
Dept: SCHEDULING | Facility: HOME HEALTH | Age: 84
End: 2019-07-25
Payer: MEDICARE

## 2019-07-25 VITALS
OXYGEN SATURATION: 99 % | DIASTOLIC BLOOD PRESSURE: 72 MMHG | HEART RATE: 62 BPM | TEMPERATURE: 98.9 F | SYSTOLIC BLOOD PRESSURE: 128 MMHG

## 2019-07-25 PROCEDURE — 3331090001 HH PPS REVENUE CREDIT

## 2019-07-25 PROCEDURE — G0151 HHCP-SERV OF PT,EA 15 MIN: HCPCS

## 2019-07-25 PROCEDURE — 3331090002 HH PPS REVENUE DEBIT

## 2019-07-25 PROCEDURE — 400013 HH SOC

## 2019-07-26 ENCOUNTER — HOME CARE VISIT (OUTPATIENT)
Dept: SCHEDULING | Facility: HOME HEALTH | Age: 84
End: 2019-07-26
Payer: MEDICARE

## 2019-07-26 VITALS
OXYGEN SATURATION: 98 % | SYSTOLIC BLOOD PRESSURE: 142 MMHG | HEART RATE: 57 BPM | RESPIRATION RATE: 17 BRPM | DIASTOLIC BLOOD PRESSURE: 73 MMHG

## 2019-07-26 PROCEDURE — G0152 HHCP-SERV OF OT,EA 15 MIN: HCPCS

## 2019-07-26 PROCEDURE — 3331090002 HH PPS REVENUE DEBIT

## 2019-07-26 PROCEDURE — 3331090001 HH PPS REVENUE CREDIT

## 2019-07-27 PROCEDURE — 3331090001 HH PPS REVENUE CREDIT

## 2019-07-27 PROCEDURE — 3331090002 HH PPS REVENUE DEBIT

## 2019-07-28 PROCEDURE — 3331090002 HH PPS REVENUE DEBIT

## 2019-07-28 PROCEDURE — 3331090001 HH PPS REVENUE CREDIT

## 2019-07-29 PROCEDURE — 3331090001 HH PPS REVENUE CREDIT

## 2019-07-29 PROCEDURE — 3331090002 HH PPS REVENUE DEBIT

## 2019-07-30 ENCOUNTER — HOME CARE VISIT (OUTPATIENT)
Dept: SCHEDULING | Facility: HOME HEALTH | Age: 84
End: 2019-07-30
Payer: MEDICARE

## 2019-07-30 PROCEDURE — G0152 HHCP-SERV OF OT,EA 15 MIN: HCPCS

## 2019-07-30 PROCEDURE — 3331090002 HH PPS REVENUE DEBIT

## 2019-07-30 PROCEDURE — 3331090001 HH PPS REVENUE CREDIT

## 2019-07-31 ENCOUNTER — HOME CARE VISIT (OUTPATIENT)
Dept: SCHEDULING | Facility: HOME HEALTH | Age: 84
End: 2019-07-31
Payer: MEDICARE

## 2019-07-31 VITALS
TEMPERATURE: 97.8 F | DIASTOLIC BLOOD PRESSURE: 75 MMHG | OXYGEN SATURATION: 97 % | RESPIRATION RATE: 18 BRPM | HEART RATE: 59 BPM | SYSTOLIC BLOOD PRESSURE: 167 MMHG

## 2019-07-31 PROCEDURE — 3331090002 HH PPS REVENUE DEBIT

## 2019-07-31 PROCEDURE — 3331090001 HH PPS REVENUE CREDIT

## 2019-07-31 PROCEDURE — G0151 HHCP-SERV OF PT,EA 15 MIN: HCPCS

## 2019-08-01 VITALS
TEMPERATURE: 98.4 F | RESPIRATION RATE: 16 BRPM | DIASTOLIC BLOOD PRESSURE: 69 MMHG | SYSTOLIC BLOOD PRESSURE: 126 MMHG | HEART RATE: 65 BPM | OXYGEN SATURATION: 99 %

## 2019-08-01 PROCEDURE — 3331090001 HH PPS REVENUE CREDIT

## 2019-08-01 PROCEDURE — 3331090002 HH PPS REVENUE DEBIT

## 2019-08-02 ENCOUNTER — HOME CARE VISIT (OUTPATIENT)
Dept: SCHEDULING | Facility: HOME HEALTH | Age: 84
End: 2019-08-02
Payer: MEDICARE

## 2019-08-02 PROCEDURE — G0151 HHCP-SERV OF PT,EA 15 MIN: HCPCS

## 2019-08-02 PROCEDURE — 3331090001 HH PPS REVENUE CREDIT

## 2019-08-02 PROCEDURE — 3331090002 HH PPS REVENUE DEBIT

## 2019-08-03 VITALS
RESPIRATION RATE: 16 BRPM | OXYGEN SATURATION: 95 % | TEMPERATURE: 98.6 F | SYSTOLIC BLOOD PRESSURE: 126 MMHG | HEART RATE: 62 BPM | DIASTOLIC BLOOD PRESSURE: 75 MMHG

## 2019-08-03 PROCEDURE — 3331090002 HH PPS REVENUE DEBIT

## 2019-08-03 PROCEDURE — 3331090001 HH PPS REVENUE CREDIT

## 2019-08-04 PROCEDURE — 3331090002 HH PPS REVENUE DEBIT

## 2019-08-04 PROCEDURE — 3331090001 HH PPS REVENUE CREDIT

## 2019-08-05 PROCEDURE — 3331090001 HH PPS REVENUE CREDIT

## 2019-08-05 PROCEDURE — 3331090002 HH PPS REVENUE DEBIT

## 2019-08-06 ENCOUNTER — HOME CARE VISIT (OUTPATIENT)
Dept: SCHEDULING | Facility: HOME HEALTH | Age: 84
End: 2019-08-06
Payer: MEDICARE

## 2019-08-06 VITALS
HEART RATE: 68 BPM | TEMPERATURE: 98.1 F | SYSTOLIC BLOOD PRESSURE: 101 MMHG | OXYGEN SATURATION: 96 % | DIASTOLIC BLOOD PRESSURE: 65 MMHG | RESPIRATION RATE: 16 BRPM

## 2019-08-06 PROCEDURE — 3331090002 HH PPS REVENUE DEBIT

## 2019-08-06 PROCEDURE — G0152 HHCP-SERV OF OT,EA 15 MIN: HCPCS

## 2019-08-06 PROCEDURE — 3331090001 HH PPS REVENUE CREDIT

## 2019-08-06 PROCEDURE — G0151 HHCP-SERV OF PT,EA 15 MIN: HCPCS

## 2019-08-07 VITALS
RESPIRATION RATE: 16 BRPM | TEMPERATURE: 98.5 F | SYSTOLIC BLOOD PRESSURE: 128 MMHG | HEART RATE: 76 BPM | OXYGEN SATURATION: 98 % | DIASTOLIC BLOOD PRESSURE: 68 MMHG

## 2019-08-07 PROCEDURE — 3331090002 HH PPS REVENUE DEBIT

## 2019-08-07 PROCEDURE — 3331090001 HH PPS REVENUE CREDIT

## 2019-08-08 ENCOUNTER — HOME CARE VISIT (OUTPATIENT)
Dept: SCHEDULING | Facility: HOME HEALTH | Age: 84
End: 2019-08-08
Payer: MEDICARE

## 2019-08-08 PROCEDURE — G0151 HHCP-SERV OF PT,EA 15 MIN: HCPCS

## 2019-08-08 PROCEDURE — 3331090002 HH PPS REVENUE DEBIT

## 2019-08-08 PROCEDURE — 3331090001 HH PPS REVENUE CREDIT

## 2019-08-09 VITALS
HEART RATE: 72 BPM | OXYGEN SATURATION: 98 % | TEMPERATURE: 98.4 F | DIASTOLIC BLOOD PRESSURE: 65 MMHG | SYSTOLIC BLOOD PRESSURE: 126 MMHG | RESPIRATION RATE: 16 BRPM

## 2019-08-09 PROCEDURE — 3331090002 HH PPS REVENUE DEBIT

## 2019-08-09 PROCEDURE — 3331090001 HH PPS REVENUE CREDIT

## 2019-08-10 PROCEDURE — 3331090002 HH PPS REVENUE DEBIT

## 2019-08-10 PROCEDURE — 3331090001 HH PPS REVENUE CREDIT

## 2019-08-11 PROCEDURE — 3331090001 HH PPS REVENUE CREDIT

## 2019-08-11 PROCEDURE — 3331090002 HH PPS REVENUE DEBIT

## 2019-08-12 PROCEDURE — 3331090001 HH PPS REVENUE CREDIT

## 2019-08-12 PROCEDURE — 3331090002 HH PPS REVENUE DEBIT

## 2019-08-13 ENCOUNTER — HOME CARE VISIT (OUTPATIENT)
Dept: SCHEDULING | Facility: HOME HEALTH | Age: 84
End: 2019-08-13
Payer: MEDICARE

## 2019-08-13 PROCEDURE — 3331090002 HH PPS REVENUE DEBIT

## 2019-08-13 PROCEDURE — 3331090001 HH PPS REVENUE CREDIT

## 2019-08-13 PROCEDURE — G0152 HHCP-SERV OF OT,EA 15 MIN: HCPCS

## 2019-08-14 VITALS
SYSTOLIC BLOOD PRESSURE: 149 MMHG | TEMPERATURE: 98.1 F | RESPIRATION RATE: 18 BRPM | OXYGEN SATURATION: 97 % | HEART RATE: 54 BPM | DIASTOLIC BLOOD PRESSURE: 77 MMHG

## 2019-08-14 PROCEDURE — 3331090002 HH PPS REVENUE DEBIT

## 2019-08-14 PROCEDURE — 3331090001 HH PPS REVENUE CREDIT

## 2019-08-15 PROCEDURE — 3331090001 HH PPS REVENUE CREDIT

## 2019-08-15 PROCEDURE — 3331090002 HH PPS REVENUE DEBIT

## 2019-08-16 PROCEDURE — 3331090001 HH PPS REVENUE CREDIT

## 2019-08-16 PROCEDURE — 3331090002 HH PPS REVENUE DEBIT

## 2019-08-17 PROCEDURE — 3331090001 HH PPS REVENUE CREDIT

## 2019-08-17 PROCEDURE — 3331090002 HH PPS REVENUE DEBIT

## 2019-08-18 PROCEDURE — 3331090002 HH PPS REVENUE DEBIT

## 2019-08-18 PROCEDURE — 3331090001 HH PPS REVENUE CREDIT

## 2019-08-19 PROCEDURE — 3331090001 HH PPS REVENUE CREDIT

## 2019-08-19 PROCEDURE — 3331090002 HH PPS REVENUE DEBIT

## 2019-08-20 PROCEDURE — 3331090002 HH PPS REVENUE DEBIT

## 2019-08-20 PROCEDURE — 3331090001 HH PPS REVENUE CREDIT

## 2019-08-21 PROCEDURE — 3331090001 HH PPS REVENUE CREDIT

## 2019-08-21 PROCEDURE — 3331090002 HH PPS REVENUE DEBIT

## 2019-08-22 PROCEDURE — 3331090002 HH PPS REVENUE DEBIT

## 2019-08-22 PROCEDURE — 3331090001 HH PPS REVENUE CREDIT

## 2019-08-23 ENCOUNTER — HOME CARE VISIT (OUTPATIENT)
Dept: SCHEDULING | Facility: HOME HEALTH | Age: 84
End: 2019-08-23
Payer: MEDICARE

## 2019-08-23 VITALS
TEMPERATURE: 98.1 F | HEART RATE: 53 BPM | OXYGEN SATURATION: 98 % | SYSTOLIC BLOOD PRESSURE: 136 MMHG | RESPIRATION RATE: 18 BRPM | DIASTOLIC BLOOD PRESSURE: 67 MMHG

## 2019-08-23 PROCEDURE — 3331090003 HH PPS REVENUE ADJ

## 2019-08-23 PROCEDURE — 3331090001 HH PPS REVENUE CREDIT

## 2019-08-23 PROCEDURE — G0152 HHCP-SERV OF OT,EA 15 MIN: HCPCS

## 2019-08-23 PROCEDURE — 3331090002 HH PPS REVENUE DEBIT

## 2019-08-24 PROCEDURE — 3331090002 HH PPS REVENUE DEBIT

## 2019-08-24 PROCEDURE — 3331090001 HH PPS REVENUE CREDIT

## 2019-08-25 PROCEDURE — 3331090001 HH PPS REVENUE CREDIT

## 2019-08-25 PROCEDURE — 3331090002 HH PPS REVENUE DEBIT

## 2019-12-15 ENCOUNTER — APPOINTMENT (OUTPATIENT)
Dept: GENERAL RADIOLOGY | Age: 84
End: 2019-12-15
Attending: EMERGENCY MEDICINE
Payer: MEDICARE

## 2019-12-15 ENCOUNTER — APPOINTMENT (OUTPATIENT)
Dept: CT IMAGING | Age: 84
End: 2019-12-15
Attending: EMERGENCY MEDICINE
Payer: MEDICARE

## 2019-12-15 ENCOUNTER — HOSPITAL ENCOUNTER (EMERGENCY)
Age: 84
Discharge: HOME | End: 2019-12-15
Attending: EMERGENCY MEDICINE
Payer: MEDICARE

## 2019-12-15 VITALS
BODY MASS INDEX: 25.2 KG/M2 | HEART RATE: 83 BPM | DIASTOLIC BLOOD PRESSURE: 70 MMHG | SYSTOLIC BLOOD PRESSURE: 148 MMHG | TEMPERATURE: 98.3 F | RESPIRATION RATE: 16 BRPM | OXYGEN SATURATION: 98 % | WEIGHT: 151.46 LBS

## 2019-12-15 DIAGNOSIS — K59.00 CONSTIPATION, UNSPECIFIED CONSTIPATION TYPE: ICD-10-CM

## 2019-12-15 DIAGNOSIS — K62.89 RECTAL PAIN: ICD-10-CM

## 2019-12-15 DIAGNOSIS — R40.4 TRANSIENT ALTERATION OF AWARENESS: Primary | ICD-10-CM

## 2019-12-15 DIAGNOSIS — R11.2 NAUSEA AND VOMITING, INTRACTABILITY OF VOMITING NOT SPECIFIED, UNSPECIFIED VOMITING TYPE: ICD-10-CM

## 2019-12-15 LAB
ALBUMIN SERPL-MCNC: 3.5 G/DL (ref 3.5–5)
ALBUMIN/GLOB SERPL: 0.9 {RATIO} (ref 1.1–2.2)
ALP SERPL-CCNC: 77 U/L (ref 45–117)
ALT SERPL-CCNC: 27 U/L (ref 12–78)
ANION GAP SERPL CALC-SCNC: 4 MMOL/L (ref 5–15)
APPEARANCE UR: CLEAR
AST SERPL-CCNC: 27 U/L (ref 15–37)
ATRIAL RATE: 79 BPM
BASOPHILS # BLD: 0.1 K/UL (ref 0–0.1)
BASOPHILS NFR BLD: 0 % (ref 0–1)
BILIRUB SERPL-MCNC: 0.3 MG/DL (ref 0.2–1)
BILIRUB UR QL: NEGATIVE
BUN SERPL-MCNC: 27 MG/DL (ref 6–20)
BUN/CREAT SERPL: 24 (ref 12–20)
CALCIUM SERPL-MCNC: 9.7 MG/DL (ref 8.5–10.1)
CALCULATED P AXIS, ECG09: 37 DEGREES
CALCULATED R AXIS, ECG10: -58 DEGREES
CALCULATED T AXIS, ECG11: -2 DEGREES
CHLORIDE SERPL-SCNC: 110 MMOL/L (ref 97–108)
CO2 SERPL-SCNC: 26 MMOL/L (ref 21–32)
COLOR UR: NORMAL
CREAT SERPL-MCNC: 1.13 MG/DL (ref 0.55–1.02)
DIAGNOSIS, 93000: NORMAL
DIFFERENTIAL METHOD BLD: ABNORMAL
EOSINOPHIL # BLD: 0.1 K/UL (ref 0–0.4)
EOSINOPHIL NFR BLD: 1 % (ref 0–7)
ERYTHROCYTE [DISTWIDTH] IN BLOOD BY AUTOMATED COUNT: 14.1 % (ref 11.5–14.5)
GLOBULIN SER CALC-MCNC: 3.9 G/DL (ref 2–4)
GLUCOSE SERPL-MCNC: 122 MG/DL (ref 65–100)
GLUCOSE UR STRIP.AUTO-MCNC: NEGATIVE MG/DL
HCT VFR BLD AUTO: 41.9 % (ref 35–47)
HGB BLD-MCNC: 12.9 G/DL (ref 11.5–16)
HGB UR QL STRIP: NEGATIVE
IMM GRANULOCYTES # BLD AUTO: 0 K/UL (ref 0–0.04)
IMM GRANULOCYTES NFR BLD AUTO: 0 % (ref 0–0.5)
KETONES UR QL STRIP.AUTO: NEGATIVE MG/DL
LEUKOCYTE ESTERASE UR QL STRIP.AUTO: NEGATIVE
LIPASE SERPL-CCNC: 146 U/L (ref 73–393)
LYMPHOCYTES # BLD: 1.8 K/UL (ref 0.8–3.5)
LYMPHOCYTES NFR BLD: 15 % (ref 12–49)
MCH RBC QN AUTO: 27 PG (ref 26–34)
MCHC RBC AUTO-ENTMCNC: 30.8 G/DL (ref 30–36.5)
MCV RBC AUTO: 87.7 FL (ref 80–99)
MONOCYTES # BLD: 1.2 K/UL (ref 0–1)
MONOCYTES NFR BLD: 10 % (ref 5–13)
NEUTS SEG # BLD: 9 K/UL (ref 1.8–8)
NEUTS SEG NFR BLD: 74 % (ref 32–75)
NITRITE UR QL STRIP.AUTO: NEGATIVE
NRBC # BLD: 0 K/UL (ref 0–0.01)
NRBC BLD-RTO: 0 PER 100 WBC
P-R INTERVAL, ECG05: 168 MS
PH UR STRIP: 5 [PH] (ref 5–8)
PLATELET # BLD AUTO: 263 K/UL (ref 150–400)
PMV BLD AUTO: 10.2 FL (ref 8.9–12.9)
POTASSIUM SERPL-SCNC: 4.8 MMOL/L (ref 3.5–5.1)
PROT SERPL-MCNC: 7.4 G/DL (ref 6.4–8.2)
PROT UR STRIP-MCNC: NEGATIVE MG/DL
Q-T INTERVAL, ECG07: 400 MS
QRS DURATION, ECG06: 98 MS
QTC CALCULATION (BEZET), ECG08: 458 MS
RBC # BLD AUTO: 4.78 M/UL (ref 3.8–5.2)
SODIUM SERPL-SCNC: 140 MMOL/L (ref 136–145)
SP GR UR REFRACTOMETRY: 1.02 (ref 1–1.03)
TROPONIN I SERPL-MCNC: <0.05 NG/ML
UROBILINOGEN UR QL STRIP.AUTO: 0.2 EU/DL (ref 0.2–1)
VENTRICULAR RATE, ECG03: 79 BPM
WBC # BLD AUTO: 12.1 K/UL (ref 3.6–11)

## 2019-12-15 PROCEDURE — 70450 CT HEAD/BRAIN W/O DYE: CPT

## 2019-12-15 PROCEDURE — 36415 COLL VENOUS BLD VENIPUNCTURE: CPT

## 2019-12-15 PROCEDURE — 81003 URINALYSIS AUTO W/O SCOPE: CPT

## 2019-12-15 PROCEDURE — 74011250636 HC RX REV CODE- 250/636: Performed by: EMERGENCY MEDICINE

## 2019-12-15 PROCEDURE — 71045 X-RAY EXAM CHEST 1 VIEW: CPT

## 2019-12-15 PROCEDURE — 99284 EMERGENCY DEPT VISIT MOD MDM: CPT

## 2019-12-15 PROCEDURE — 74176 CT ABD & PELVIS W/O CONTRAST: CPT

## 2019-12-15 PROCEDURE — 96374 THER/PROPH/DIAG INJ IV PUSH: CPT

## 2019-12-15 PROCEDURE — 96361 HYDRATE IV INFUSION ADD-ON: CPT

## 2019-12-15 PROCEDURE — 85025 COMPLETE CBC W/AUTO DIFF WBC: CPT

## 2019-12-15 PROCEDURE — 80053 COMPREHEN METABOLIC PANEL: CPT

## 2019-12-15 PROCEDURE — 84484 ASSAY OF TROPONIN QUANT: CPT

## 2019-12-15 PROCEDURE — 93005 ELECTROCARDIOGRAM TRACING: CPT

## 2019-12-15 PROCEDURE — 83690 ASSAY OF LIPASE: CPT

## 2019-12-15 RX ORDER — DOCUSATE SODIUM 100 MG/1
100 CAPSULE, LIQUID FILLED ORAL
Qty: 30 CAP | Refills: 2 | Status: SHIPPED | OUTPATIENT
Start: 2019-12-15 | End: 2020-03-14

## 2019-12-15 RX ORDER — SODIUM CHLORIDE 0.9 % (FLUSH) 0.9 %
10 SYRINGE (ML) INJECTION
Status: DISCONTINUED | OUTPATIENT
Start: 2019-12-15 | End: 2019-12-16 | Stop reason: HOSPADM

## 2019-12-15 RX ORDER — HYDROCORTISONE 25 MG/G
CREAM TOPICAL
Qty: 30 G | Refills: 0 | Status: SHIPPED | OUTPATIENT
Start: 2019-12-15

## 2019-12-15 RX ORDER — ONDANSETRON 4 MG/1
4 TABLET, ORALLY DISINTEGRATING ORAL
Qty: 10 TAB | Refills: 0 | Status: SHIPPED | OUTPATIENT
Start: 2019-12-15

## 2019-12-15 RX ORDER — ONDANSETRON 2 MG/ML
4 INJECTION INTRAMUSCULAR; INTRAVENOUS
Status: COMPLETED | OUTPATIENT
Start: 2019-12-15 | End: 2019-12-15

## 2019-12-15 RX ADMIN — ONDANSETRON 4 MG: 2 INJECTION INTRAMUSCULAR; INTRAVENOUS at 17:02

## 2019-12-15 RX ADMIN — SODIUM CHLORIDE 250 ML: 900 INJECTION, SOLUTION INTRAVENOUS at 17:02

## 2019-12-15 NOTE — ED PROVIDER NOTES
EMERGENCY DEPARTMENT HISTORY AND PHYSICAL EXAM      Date: 12/15/2019  Patient Name: Jayson Nunez    History of Presenting Illness     Chief Complaint   Patient presents with    Vomiting       History Provided By: Patient, Patient's Daughter and EMS    HPI: Jayson Nunez, 80 y.o. female presents to the ED with cc of vomiting. The patient has dementia, and is normally alert and oriented x1. According to her daughter, [facility] [facility]. She did not get much sleep last night. The daughter has put her on the bed side commode and came back into check on her. At that time, she appeared to be out of it and vomited once. She was very cold and diaphoretic at that time as well. Patient has been complaining of rectal pain today. She is not answering all of my questions at this time, so the review of systems will be limited. There are no other complaints, changes, or physical findings at this time. PCP: Lex Franklin MD    No current facility-administered medications on file prior to encounter. Current Outpatient Medications on File Prior to Encounter   Medication Sig Dispense Refill    ferrous sulfate 325 mg (65 mg iron) tablet Take 325 mg by mouth daily.  pantoprazole (PROTONIX) 40 mg tablet Take 40 mg by mouth daily.  polyethylene glycol (MIRALAX) 17 gram/dose powder Take 17 g by mouth daily. 1 tablespoon with 8 oz of water daily 289 g 0    bisacodyl (DULCOLAX, BISACODYL,) 10 mg suppository Insert 10 mg into rectum daily. 5 Suppository 0    furosemide (LASIX) 40 mg tablet Take 1 Tab by mouth daily. (Patient taking differently: Take 20 mg by mouth daily.) 30 Tab 12    ascorbic acid, vitamin C, (VITAMIN C) 500 mg tablet Take 500 mg by mouth every evening.  memantine ER (NAMENDA XR) 14 mg capsule Take 14 mg by mouth daily.  latanoprost (XALATAN) 0.005 % ophthalmic solution Administer 1 Drop to both eyes daily.       clotrimazole (LOTRIMIN AF, CLOTRIMAZOLE,) 1 % topical cream Apply  to affected area two (2) times daily as needed for Skin Irritation (White spots).  furosemide (LASIX) 40 mg tablet Take 1 Tab by mouth daily. 30 Tab 1    potassium chloride SR (KLOR-CON 10) 10 mEq tablet Take 1 Tab by mouth daily. (Patient taking differently: Take 8 mEq by mouth daily.) 30 Tab 1    BRIMONIDINE TARTRATE/TIMOLOL (COMBIGAN OP) Administer 1 Drop to both eyes two (2) times a day.  aspirin 81 mg chewable tablet Take 40.5 mg by mouth every evening. Indications: pt takes 1/2 tablet      cyanocobalamin (VITAMIN B-12) 1,000 mcg tablet Take 1,000 mcg by mouth daily.  diclofenac (VOLTAREN) 1 % topical gel Apply 4 g to affected area three (3) times daily as needed for Pain.  GLUCOSAMINE HCL/CHONDR JONES A NA (GLUCOSAMINE-CHONDROITIN) 750-600 mg tab Take 2 Tabs by mouth daily.  acetaminophen (TYLENOL) 325 mg tablet Take 325 mg by mouth every six (6) hours as needed for Pain.  ascorbic acid (VITAMIN C) 250 mg tablet Take 500 mg by mouth daily.  FERROUS SULFATE Take 1 Tab by mouth daily.          Past History     Past Medical History:  Past Medical History:   Diagnosis Date    Anemia     Arthritis     Chronic pain     all over due to arthritis/knees/back/hand    Dementia     Glaucoma     Hypertension     Other ill-defined conditions(799.89)     hiatal hernia    Other ill-defined conditions(799.89)     glaucoma    Other ill-defined conditions(799.89)     diverticulosis    Other ill-defined conditions(799.89)     2 cysts in arms    Other ill-defined conditions(799.89)     intestional blockage    Psychiatric disorder     dementia/anxiety    Stroke [facility])     mini stroke       Past Surgical History:  Past Surgical History:   Procedure Laterality Date    HX GYN      surgery to removed  fetus (abdominal incision)    HX LAP CHOLECYSTECTOMY      HX ORTHOPAEDIC      cyst removed from right arm - benign       Family History:  No family history on file. Social History:  Social History     Tobacco Use    Smoking status: Former Smoker    Smokeless tobacco: Never Used    Tobacco comment: former cigarette smoker - 15 yrs ago   Substance Use Topics    Alcohol use: No    Drug use: Not on file       Allergies:  No Known Allergies      Review of Systems   Review of Systems   Unable to perform ROS: Dementia       Physical Exam   Physical Exam  Vitals signs and nursing note reviewed. Constitutional:       General: She is not in acute distress. Appearance: She is well-developed. Comments: Lethargic   HENT:      Head: Normocephalic. Eyes:      Extraocular Movements: Extraocular movements intact. Pupils: Pupils are equal, round, and reactive to light. Neck:      Musculoskeletal: Normal range of motion and neck supple. Cardiovascular:      Rate and Rhythm: Normal rate and regular rhythm. Heart sounds: Normal heart sounds. Pulmonary:      Effort: Pulmonary effort is normal.      Breath sounds: Normal breath sounds. Abdominal:      General: Bowel sounds are normal.      Palpations: Abdomen is soft. Tenderness: There is no tenderness. Genitourinary:     Comments: Tender, possible hemorrhoid perirectally  Musculoskeletal: Normal range of motion. Skin:     General: Skin is warm and dry. Neurological:      Comments: Alert and oriented x0, not following all commands. Psychiatric:         Mood and Affect: Mood normal.         Behavior: Behavior normal.         Diagnostic Study Results     Labs -   No results found for this or any previous visit (from the past 12 hour(s)). Radiologic Studies -   CT HEAD WO CONT   Final Result   IMPRESSION: No acute intracranial disease. Chronic ischemic findings. CT ABD PELV WO CONT   Final Result   IMPRESSION: No Acute Disease. Prominent stool. XR CHEST PORT   Final Result   IMPRESSION: No Acute Disease.            CT Results  (Last 48 hours)    None CXR Results  (Last 48 hours)    None          Medical Decision Making   I am the first provider for this patient. I reviewed the vital signs, available nursing notes, past medical history, past surgical history, family history and social history. Vital Signs-Reviewed the patient's vital signs. Patient Vitals for the past 12 hrs:   Temp Pulse Resp BP SpO2   12/15/19 1446 98.3 °F (36.8 °C) 83 16 148/70 98 %       EKG interpretation: (Preliminary)  Rhythm: normal sinus rhythm and PAC's; and regular . Rate (approx.): 79; Axis: normal; LA interval: normal; QRS interval: normal ; ST/T wave: non-specific changes; Other findings: PACs are new    Records Reviewed: Nursing Notes, Old Medical Records, Previous electrocardiograms, Ambulance Run Sheet, Previous Radiology Studies and Previous Laboratory Studies    Provider Notes (Medical Decision Making): Intracranial hemorrhage, CAD, gastritis, pancreatitis, UTI, hemorrhoid, decubitus ulcer,    ED Course:   Initial assessment performed. The patients presenting problems have been discussed, and they are in agreement with the care plan formulated and outlined with them. I have encouraged them to ask questions as they arise throughout their visit. Progress note: The patient is back to baseline. Her results were reviewed. She and her family member were advised to follow-up and return ER if worse           Critical Care Time:   0    Disposition:  home    PLAN:  1. Discharge Medication List as of 12/15/2019  8:27 PM      START taking these medications    Details   hydrocortisone (ANUSOL-HC) 2.5 % rectal cream Insert  into rectum four (4) times daily as needed for Hemorrhoids. , Normal, Disp-30 g, R-0      docusate sodium (COLACE) 100 mg capsule Take 1 Cap by mouth two (2) times daily as needed for Constipation for up to 90 days. , Normal, Disp-30 Cap, R-2      ondansetron (ZOFRAN ODT) 4 mg disintegrating tablet Take 1 Tab by mouth every eight (8) hours as needed for Nausea., Normal, Disp-10 Tab, R-0         CONTINUE these medications which have NOT CHANGED    Details   ferrous sulfate 325 mg (65 mg iron) tablet Take 325 mg by mouth daily. , Historical Med      pantoprazole (PROTONIX) 40 mg tablet Take 40 mg by mouth daily. , Historical Med      polyethylene glycol (MIRALAX) 17 gram/dose powder Take 17 g by mouth daily. 1 tablespoon with 8 oz of water daily, Print, Disp-289 g, R-0      bisacodyl (DULCOLAX, BISACODYL,) 10 mg suppository Insert 10 mg into rectum daily. , Print, Disp-5 Suppository, R-0      !! furosemide (LASIX) 40 mg tablet Take 1 Tab by mouth daily. , No Print, Disp-30 Tab, R-12      !! ascorbic acid, vitamin C, (VITAMIN C) 500 mg tablet Take 500 mg by mouth every evening., Historical Med      memantine ER (NAMENDA XR) 14 mg capsule Take 14 mg by mouth daily. , Historical Med      latanoprost (XALATAN) 0.005 % ophthalmic solution Administer 1 Drop to both eyes daily. , Historical Med      clotrimazole (LOTRIMIN AF, CLOTRIMAZOLE,) 1 % topical cream Apply  to affected area two (2) times daily as needed for Skin Irritation (White spots). , Historical Med      !! furosemide (LASIX) 40 mg tablet Take 1 Tab by mouth daily. , Normal, Disp-30 Tab, R-1      potassium chloride SR (KLOR-CON 10) 10 mEq tablet Take 1 Tab by mouth daily. , Normal, Disp-30 Tab, R-1      BRIMONIDINE TARTRATE/TIMOLOL (COMBIGAN OP) Administer 1 Drop to both eyes two (2) times a day., Historical Med      aspirin 81 mg chewable tablet Take 40.5 mg by mouth every evening. Indications: pt takes 1/2 tablet, Historical Med      cyanocobalamin (VITAMIN B-12) 1,000 mcg tablet Take 1,000 mcg by mouth daily. , Historical Med      diclofenac (VOLTAREN) 1 % topical gel Apply 4 g to affected area three (3) times daily as needed for Pain., Historical Med      GLUCOSAMINE HCL/CHONDR JONES A NA (GLUCOSAMINE-CHONDROITIN) 750-600 mg tab Take 2 Tabs by mouth daily. , Historical Med      acetaminophen (TYLENOL) 325 mg tablet Take 325 mg by mouth every six (6) hours as needed for Pain., Historical Med      !! ascorbic acid (VITAMIN C) 250 mg tablet Take 500 mg by mouth daily. , Historical Med      FERROUS SULFATE Take 1 Tab by mouth daily. , Historical Med       !! - Potential duplicate medications found. Please discuss with provider. 2.   Follow-up Information     Follow up With Specialties Details Why Contact Info    Karen Fitzpatrick MD Family Practice In 2 days As needed [place] [id number]  [phone]      [address] DEPT Emergency Medicine  If symptoms worsen [address]  [address] [facility]  [phone]        Return to ED if worse     Diagnosis     Clinical Impression:   1. Transient alteration of awareness    2. Nausea and vomiting, intractability of vomiting not specified, unspecified vomiting type    3. Constipation, unspecified constipation type    4. Rectal pain        Attestations:    Nelli Yeung MD    Please note that this dictation was completed with [employer], the computer voice recognition software. Quite often unanticipated grammatical, syntax, homophones, and other interpretive errors are inadvertently transcribed by the computer software. Please disregard these errors. Please excuse any errors that have escaped final proofreading. Thank you.

## 2019-12-16 NOTE — DISCHARGE INSTRUCTIONS
Patient Education        Anal Pain: Care Instructions  Your Care Instructions  Pain in the opening to the rectum (anus) can be caused by diarrhea or constipation or by scratching a rectal itch. A common cause of anal pain is a tear in the lining of the lower rectum (anal fissure). This type of anal pain usually goes away when the problem clears up. Injury during anal sex or from an object being placed in the rectum also can cause pain. A rare cause of anal pain is spasms of the muscles in the rectum. Some of these conditions may cause some light bleeding. Home treatment usually can relieve anal pain. If you continue to have anal pain, your doctor may prescribe medicine to relieve pain and other symptoms. Depending on the cause, you may need other treatment. Follow-up care is a key part of your treatment and safety. Be sure to make and go to all appointments, and call your doctor if you are having problems. It's also a good idea to know your test results and keep a list of the medicines you take. How can you care for yourself at home? · Sit in a few inches of warm water (sitz bath) 3 times a day and after bowel movements. The warm water eases discomfort. Do not put soaps, salts, or shampoos in the water. · Drink plenty of fluids, enough so that your urine is light yellow or clear like water. If you have kidney, heart, or liver disease and have to limit fluids, talk with your doctor before you increase the amount of fluids you drink. · Include high-fiber foods, such as fruits, vegetables, beans, and whole grains, in your diet each day. · Take a fiber supplement, such as Benefiber, Citrucel, or Metamucil, every day. Read and follow all instructions on the label. · Use the toilet when you feel the urge. Or when you can, schedule time each day for a bowel movement. A daily routine may help. Take your time and do not strain when having a bowel movement. But do not sit on the toilet too long.   · Support your feet with a small step stool when you sit on the toilet. This helps flex your hips and places your pelvis in a squatting position. · Your doctor may recommend an over-the-counter laxative, such as Miralax, Milk of Magnesia, or Ex-Lax. Read and follow all instructions on the label, and do not use laxatives on a long-term basis. · Do not use over-the-counter ointments or creams without talking to your doctor. Some of these may not help. · Use baby wipes or medicated pads, such as Preparation H or Tucks, instead of toilet paper to clean after a bowel movement. These products do not irritate the anus. · Be safe with medicines. Read and follow all instructions on the label. ? If the doctor gave you a prescription medicine for pain, give it as prescribed. ? If you are not taking a prescription pain medicine, ask your doctor if you can take an over-the-counter medicine. When should you call for help? Call your doctor now or seek immediate medical care if:    · You have new or worse pain.     · You have new or worse bleeding from the rectum.    Watch closely for changes in your health, and be sure to contact your doctor if:    · You have trouble passing stools.     · You do not get better as expected. Where can you learn more? Go to http://césar-radha.info/. Enter 486 [id number] in the search box to learn more about \"Anal Pain: Care Instructions. \"  Current as of: November 7, 2018  Content Version: 12.2  © 8464-1650 [employer]. Care instructions adapted under license by [employer] (which disclaims liability or warranty for this information). If you have questions about a medical condition or this instruction, always ask your healthcare professional. [place] [address] any warranty or liability for your use of this information.          Patient Education        Constipation: Care Instructions  Your Care Instructions    Constipation means that you have a hard time passing stools (bowel movements). People pass stools from 3 times a day to once every 3 days. What is normal for you may be different. Constipation may occur with pain in the rectum and cramping. The pain may get worse when you try to pass stools. Sometimes there are small amounts of bright red blood on toilet paper or the surface of stools. This is because of enlarged veins near the rectum (hemorrhoids). A few changes in your diet and lifestyle may help you avoid ongoing constipation. Your doctor may also prescribe medicine to help loosen your stool. Some medicines can cause constipation. These include pain medicines and antidepressants. Tell your doctor about all the medicines you take. Your doctor may want to make a medicine change to ease your symptoms. Follow-up care is a key part of your treatment and safety. Be sure to make and go to all appointments, and call your doctor if you are having problems. It's also a good idea to know your test results and keep a list of the medicines you take. How can you care for yourself at home? · Drink plenty of fluids, enough so that your urine is light yellow or clear like water. If you have kidney, heart, or liver disease and have to limit fluids, talk with your doctor before you increase the amount of fluids you drink. · Include high-fiber foods in your diet each day. These include fruits, vegetables, beans, and whole grains. · Get at least 30 minutes of exercise on most days of the week. Walking is a good choice. You also may want to do other activities, such as running, swimming, cycling, or playing tennis or team sports. · Take a fiber supplement, such as Citrucel or Metamucil, every day. Read and follow all instructions on the label. · Schedule time each day for a bowel movement. A daily routine may help. Take your time having your bowel movement. · Support your feet with a small step stool when you sit on the toilet.  This helps flex your hips and places your pelvis in a squatting position. · Your doctor may recommend an over-the-counter laxative to relieve your constipation. Examples are Milk of Magnesia and MiraLax. Read and follow all instructions on the label. Do not use laxatives on a long-term basis. When should you call for help? Call your doctor now or seek immediate medical care if:    · You have new or worse belly pain.     · You have new or worse nausea or vomiting.     · You have blood in your stools.    Watch closely for changes in your health, and be sure to contact your doctor if:    · Your constipation is getting worse.     · You do not get better as expected. Where can you learn more? Go to http://césarDreamforgeradha.info/. Enter 21 [phone] in the search box to learn more about \"Constipation: Care Instructions. \"  Current as of: June 26, 2019  Content Version: 12.2  © 5606-0714 [employer]. Care instructions adapted under license by [employer] (which disclaims liability or warranty for this information). If you have questions about a medical condition or this instruction, always ask your healthcare professional. [place] [address] any warranty or liability for your use of this information. Patient Education        Nausea and Vomiting: Care Instructions  Your Care Instructions    When you are nauseated, you may feel weak and sweaty and notice a lot of saliva in your mouth. Nausea often leads to vomiting. Most of the time you do not need to worry about nausea and vomiting, but they can be signs of other illnesses. Two common causes of nausea and vomiting are stomach flu and food poisoning. Nausea and vomiting from viral stomach flu will usually start to improve within 24 hours. Nausea and vomiting from food poisoning may last from 12 to 48 hours. The doctor has checked you carefully, but problems can develop later.  If you notice any problems or new symptoms, get medical treatment right away.  Follow-up care is a key part of your treatment and safety. Be sure to make and go to all appointments, and call your doctor if you are having problems. It's also a good idea to know your test results and keep a list of the medicines you take. How can you care for yourself at home? · To prevent dehydration, drink plenty of fluids, enough so that your urine is light yellow or clear like water. Choose water and other caffeine-free clear liquids until you feel better. If you have kidney, heart, or liver disease and have to limit fluids, talk with your doctor before you increase the amount of fluids you drink. · Rest in bed until you feel better. · When you are able to eat, try clear soups, mild foods, and liquids until all symptoms are gone for 12 to 48 hours. Other good choices include dry toast, crackers, cooked cereal, and gelatin dessert, such as Jell-O. When should you call for help? Call 911 anytime you think you may need emergency care. For example, call if:    · You passed out (lost consciousness).    Call your doctor now or seek immediate medical care if:    · You have symptoms of dehydration, such as:  ? Dry eyes and a dry mouth. ? Passing only a little dark urine. ? Feeling thirstier than usual.     · You have new or worsening belly pain.     · You have a new or higher fever.     · You vomit blood or what looks like coffee grounds.    Watch closely for changes in your health, and be sure to contact your doctor if:    · You have ongoing nausea and vomiting.     · Your vomiting is getting worse.     · Your vomiting lasts longer than 2 days.     · You are not getting better as expected. Where can you learn more? Go to http://césar-radha.info/. Enter 25 [id number] in the search box to learn more about \"Nausea and Vomiting: Care Instructions. \"  Current as of: June 26, 2019  Content Version: 12.2  © 8809-9073 [employer].  Care instructions adapted under license by [employer] (which disclaims liability or warranty for this information). If you have questions about a medical condition or this instruction, always ask your healthcare professional. [address] any warranty or liability for your use of this information.

## 2019-12-16 NOTE — ED NOTES
I have reviewed discharge instructions with the caregiver. The caregiver verbalized understanding.  Pt wheeled to lobby with family, no distress noted, no needs at time

## 2023-02-14 ENCOUNTER — TELEPHONE (OUTPATIENT)
Dept: FAMILY MEDICINE CLINIC | Age: 88
End: 2023-02-14